# Patient Record
Sex: FEMALE | Race: BLACK OR AFRICAN AMERICAN | NOT HISPANIC OR LATINO | Employment: FULL TIME | URBAN - METROPOLITAN AREA
[De-identification: names, ages, dates, MRNs, and addresses within clinical notes are randomized per-mention and may not be internally consistent; named-entity substitution may affect disease eponyms.]

---

## 2017-07-17 RX ORDER — IRBESARTAN 150 MG/1
TABLET ORAL
Qty: 90 TABLET | Refills: 1 | Status: SHIPPED | OUTPATIENT
Start: 2017-07-17 | End: 2017-11-17 | Stop reason: SDUPTHER

## 2017-08-07 RX ORDER — ATENOLOL AND CHLORTHALIDONE TABLET 100; 25 MG/1; MG/1
TABLET ORAL
Qty: 45 TABLET | Refills: 1 | Status: SHIPPED | OUTPATIENT
Start: 2017-08-07 | End: 2017-11-17 | Stop reason: SDUPTHER

## 2017-11-17 ENCOUNTER — OFFICE VISIT (OUTPATIENT)
Dept: FAMILY MEDICINE | Facility: CLINIC | Age: 42
End: 2017-11-17
Payer: COMMERCIAL

## 2017-11-17 VITALS
SYSTOLIC BLOOD PRESSURE: 138 MMHG | HEART RATE: 71 BPM | WEIGHT: 208 LBS | HEIGHT: 65 IN | DIASTOLIC BLOOD PRESSURE: 88 MMHG | BODY MASS INDEX: 34.66 KG/M2

## 2017-11-17 DIAGNOSIS — I10 ESSENTIAL HYPERTENSION: Primary | ICD-10-CM

## 2017-11-17 PROCEDURE — 99213 OFFICE O/P EST LOW 20 MIN: CPT | Mod: ,,, | Performed by: INTERNAL MEDICINE

## 2017-11-17 RX ORDER — ATENOLOL AND CHLORTHALIDONE TABLET 100; 25 MG/1; MG/1
0.5 TABLET ORAL DAILY
Qty: 45 TABLET | Refills: 1 | Status: SHIPPED | OUTPATIENT
Start: 2017-11-17 | End: 2018-08-26 | Stop reason: SDUPTHER

## 2017-11-17 RX ORDER — THYROID, PORCINE 30 MG/1
1 TABLET ORAL DAILY
COMMUNITY
Start: 2017-11-08 | End: 2018-04-26

## 2017-11-17 RX ORDER — ERGOCALCIFEROL 1.25 MG/1
50000 CAPSULE ORAL
COMMUNITY
End: 2019-06-24

## 2017-11-17 RX ORDER — IRBESARTAN 150 MG/1
150 TABLET ORAL NIGHTLY
Qty: 90 TABLET | Refills: 1 | Status: SHIPPED | OUTPATIENT
Start: 2017-11-17 | End: 2018-04-26 | Stop reason: ALTCHOICE

## 2017-11-17 NOTE — PROGRESS NOTES
Subjective:       Patient ID: George Denny is a 42 y.o. female.    Chief Complaint: Hypertension    Ms. Vazquez is a 42-year-old -American female who comes for follow-up. She has underlying hypertension.    She has seen her gynecologist recently and has been prescribed [place] Thyroid for supposed hormonal abnormality.      Hypertension   This is a chronic problem. The current episode started more than 1 year ago. The problem has been waxing and waning since onset. The problem is uncontrolled. Associated symptoms include sweats. Pertinent negatives include no anxiety, chest pain, headaches, palpitations, peripheral edema or shortness of breath. Risk factors for coronary artery disease include sedentary lifestyle, obesity and dyslipidemia. Past treatments include beta blockers, diuretics and angiotensin blockers. The current treatment provides moderate improvement. Compliance problems include exercise and diet (Ladarius Pope cofeadelaida Carlynda price, 2-4 sccops of icecream daily).  There is no history of heart failure, PVD, renovascular disease or retinopathy. There is no history of pheochromocytoma.       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[employer]     [employer] [place]     Social History Main Topics    Smoking status: Never Smoker    Smokeless tobacco: Never Used    Alcohol use Yes    Drug use: No    Sexual activity: Yes     Partners: Male     Other Topics Concern    Not on file     Social History Narrative    No narrative on file     Past Surgical History:   Procedure Laterality Date     SECTION      HERNIA REPAIR      HYSTERECTOMY       Family History   Problem Relation Age of Onset     "Hypertension Mother     Heart disease Mother     Diabetes Father     Heart disease Father     Hypertension Father     Gout Father        Review of Systems   Constitutional: Negative for activity change, chills, fatigue, fever and unexpected weight change.   HENT: Negative for congestion, postnasal drip and sinus pressure.    Eyes: Negative for pain, discharge and visual disturbance.   Respiratory: Negative for cough, chest tightness and shortness of breath.    Cardiovascular: Negative for chest pain, palpitations and leg swelling.        Hypertension   Gastrointestinal: Negative for abdominal distention, anal bleeding, constipation and diarrhea.   Endocrine: Negative for heat intolerance and polyphagia.        Patient has been told that she needs [place] Thyroid.   Genitourinary: Negative for difficulty urinating, dysuria, flank pain and frequency.   Musculoskeletal: Negative for arthralgias and joint swelling.   Skin: Negative for color change, pallor and rash.   Allergic/Immunologic: Negative for environmental allergies, food allergies and immunocompromised state.   Neurological: Negative for dizziness, tremors, seizures, syncope, light-headedness and headaches.   Hematological: Negative for adenopathy. Does not bruise/bleed easily.   Psychiatric/Behavioral: Negative for agitation, confusion and dysphoric mood. The patient is not nervous/anxious.        Objective:       Vitals:    11/17/17 0954 11/17/17 1055   BP: (!) 141/81 138/88   BP Location:  Right arm   Patient Position:  Sitting   BP Method:  Large (Automatic)   Pulse: 71    Weight: 94.3 kg (208 lb)    Height: 5' 5" (1.651 m)      Physical Exam   Constitutional: She is oriented to person, place, and time. She appears well-developed. She is cooperative. No distress.   Simple obesity with a BMI of 34.   HENT:   Head: Normocephalic and atraumatic.   Eyes: Conjunctivae, EOM and lids are normal. Pupils are equal, round, and reactive to light. Lids are " everted and swept, no foreign bodies found. Right pupil is round and reactive. Left pupil is round and reactive.   Neck: Trachea normal and normal range of motion. Neck supple.   Cardiovascular: Normal rate, regular rhythm, S1 normal, S2 normal and normal heart sounds.    Pulmonary/Chest: Breath sounds normal.   Abdominal: Soft. Bowel sounds are normal. There is no rigidity and no guarding.   Musculoskeletal: Normal range of motion.   Lymphadenopathy:     She has no cervical adenopathy.   Neurological: She is alert and oriented to person, place, and time.   Skin: Skin is warm and dry.   Psychiatric: She has a normal mood and affect. Her behavior is normal. Judgment and thought content normal.   Nursing note and vitals reviewed.      Assessment:       1. Essential hypertension         Plan:           Essential hypertension    Other orders  -     atenolol-chlorthalidone (TENORETIC) 100-25 mg per tablet; Take 0.5 tablets by mouth once daily.  Dispense: 45 tablet; Refill: 1  -     irbesartan (AVAPRO) 150 MG tablet; Take 1 tablet (150 mg total) by mouth every evening.  Dispense: 90 tablet; Refill: 1    Patient has been advised to watch diet and exercise. Avoid fried and fatty food. Compliance to medications and follow up urged.      I had a moderate dietary discussion with her and I pointed out to her that the [employer] loaded coffee and 3-4 scoops of ice cream might be a big calorie load for her. I've asked her to calculate the calories and she needs to start cutting down on above mentioned foods.    I will see her in 3-4 months and have set a target for her to be less than 200 pounds. More greens and vegetables. Modest exercise. Preventive care issues and seasonal immunizations have also been discussed.    Patient examined in presence of Ms. Wheat.

## 2017-11-17 NOTE — PATIENT INSTRUCTIONS
Taking Your Blood Pressure  Blood pressure is the force of blood against the artery wall as it moves from the heart through the blood vessels. You can take your own blood pressure reading using a digital monitor. Take your readings the same each time, using the same arm. Take readings as often as your healthcare provider instructs.  About blood pressure monitors  Blood pressure monitors are designed for certain ages and cases. You can find monitors for older adults, for pregnant women, and for children. Make sure the one you choose is the right one for your age and situation.  The American Heart Association recommends an automatic cuff monitor that fits on your upper arm (bicep). The cuff should fit your arm size. A cuff thats too large or too small will not give an accurate reading. Measure around your upper arm to find your size.  Monitors that attach to your finger or wrist are not as accurate as monitors for your upper arm.  Ask your healthcare provider for help in choosing a monitor. Bring your monitor to your next provider visit if you need help in using it the correct way.  The steps below are general instructions for using an automatic digital monitor.  Step 1. Relax    · Take your blood pressure at the same time every day, such as in the morning or evening, or at the time your healthcare provider recommends.  · Wait at least a half-hour after smoking, eating, or exercising. Don't drink coffee, tea, soda, or other caffeinated beverages before checking your blood pressure.  · Sit comfortably at a table with both feet on the floor. Do not cross your legs or feet. Place the monitor near you.  · Rest for a few minutes before you begin.  Step 2. Wrap the cuff    · Place your arm on the table, palm up. Your arm should be at the level of your heart. Wrap the cuff around your upper arm, just above your elbow. Its best done on bare skin, not over clothing. Most cuffs will indicate where the brachial artery (the  blood vessel in the middle of the arm at the inner side of the elbow) should line up with the cuff. Look in your monitor's instruction booklet for an illustration. You can also bring your cuff to your healthcare provider and have them show you how to correctly place the cuff.  Step 3. Inflate the cuff    · Push the button that starts the pump.  · The cuff will tighten, then loosen.  · The numbers will change. When they stop changing, your blood pressure reading will appear.  · Take 2 or 3 readings one minute apart.  Step 4. Write down the results of each reading    · Write down your blood pressure numbers for each reading. Note the date and time. Keep your results in one place, such as a notebook. Even if your monitor has a built-in memory, keep a hard copy of the readings.  · Remove the cuff from your arm. Turn off the machine.  · Bring your blood pressure records with your healthcare providers at each visit.  · If you start a new blood pressure medicine, note the day you started the new medicine. Also note the day if you change the dose of your medicine. This information goes on your blood pressure recording sheet. This will help your healthcare provider monitor how well the medicine changes are working.  · Ask your healthcare provider what numbers should prompt you to call him or her. Also ask what numbers should prompt you to get help right away.  Date Last Reviewed: 11/1/2016  © 6635-1840 [employer]. [address] [place] [address], [place] [address]. All rights reserved. This information is not intended as a substitute for professional medical care. Always follow your healthcare professional's instructions.

## 2017-11-18 LAB
ALBUMIN SERPL-MCNC: 4.6 G/DL (ref 3.6–5.1)
ALBUMIN/CREAT UR: 2 MCG/MG CREAT
ALBUMIN/GLOB SERPL: 1.4 (CALC) (ref 1–2.5)
ALP SERPL-CCNC: 48 U/L (ref 33–115)
ALT SERPL-CCNC: 9 U/L (ref 6–29)
AST SERPL-CCNC: 15 U/L (ref 10–30)
BILIRUB SERPL-MCNC: 0.5 MG/DL (ref 0.2–1.2)
BUN SERPL-MCNC: 12 MG/DL (ref 7–25)
BUN/CREAT SERPL: ABNORMAL (CALC) (ref 6–22)
CALCIUM SERPL-MCNC: 9.9 MG/DL (ref 8.6–10.2)
CHLORIDE SERPL-SCNC: 101 MMOL/L (ref 98–110)
CHOLEST SERPL-MCNC: 202 MG/DL
CHOLEST/HDLC SERPL: 3.5 (CALC)
CO2 SERPL-SCNC: 33 MMOL/L (ref 20–31)
CREAT SERPL-MCNC: 0.74 MG/DL (ref 0.5–1.1)
CREAT UR-MCNC: 88 MG/DL (ref 20–320)
GFR SERPL CREATININE-BSD FRML MDRD: 99 ML/MIN/1.73M2
GLOBULIN SER CALC-MCNC: 3.2 G/DL (CALC) (ref 1.9–3.7)
GLUCOSE SERPL-MCNC: 98 MG/DL (ref 65–99)
HDLC SERPL-MCNC: 58 MG/DL
LDLC SERPL CALC-MCNC: 116 MG/DL (CALC)
MICROALBUMIN UR-MCNC: 0.2 MG/DL
NONHDLC SERPL-MCNC: 144 MG/DL (CALC)
POTASSIUM SERPL-SCNC: 4.8 MMOL/L (ref 3.5–5.3)
PROT SERPL-MCNC: 7.8 G/DL (ref 6.1–8.1)
SODIUM SERPL-SCNC: 139 MMOL/L (ref 135–146)
TRIGL SERPL-MCNC: 162 MG/DL

## 2018-01-31 ENCOUNTER — TELEPHONE (OUTPATIENT)
Dept: FAMILY MEDICINE | Facility: CLINIC | Age: 43
End: 2018-01-31

## 2018-01-31 ENCOUNTER — OFFICE VISIT (OUTPATIENT)
Dept: FAMILY MEDICINE | Facility: CLINIC | Age: 43
End: 2018-01-31
Payer: COMMERCIAL

## 2018-01-31 VITALS
OXYGEN SATURATION: 99 % | BODY MASS INDEX: 34.66 KG/M2 | HEART RATE: 77 BPM | SYSTOLIC BLOOD PRESSURE: 122 MMHG | HEIGHT: 65 IN | DIASTOLIC BLOOD PRESSURE: 84 MMHG | WEIGHT: 208 LBS

## 2018-01-31 DIAGNOSIS — M54.14 RADICULAR PAIN OF THORACIC REGION: Primary | ICD-10-CM

## 2018-01-31 DIAGNOSIS — M54.6 ACUTE MIDLINE THORACIC BACK PAIN: ICD-10-CM

## 2018-01-31 PROCEDURE — 3008F BODY MASS INDEX DOCD: CPT | Mod: ,,, | Performed by: NURSE PRACTITIONER

## 2018-01-31 PROCEDURE — 99213 OFFICE O/P EST LOW 20 MIN: CPT | Mod: ,,, | Performed by: NURSE PRACTITIONER

## 2018-01-31 RX ORDER — TRAMADOL HYDROCHLORIDE 50 MG/1
50 TABLET ORAL EVERY 6 HOURS PRN
Refills: 0 | COMMUNITY
Start: 2018-01-25 | End: 2018-04-26

## 2018-01-31 RX ORDER — CYCLOBENZAPRINE HCL 10 MG
1 TABLET ORAL EVERY 6 HOURS PRN
Refills: 0 | COMMUNITY
Start: 2018-01-25 | End: 2018-04-26

## 2018-01-31 NOTE — TELEPHONE ENCOUNTER
Pt. called & said that you gave her a note saying she couldn't lift more than 10 lbs. but it didn't say how long. She wanted to know if you could do another note w/that info. on it.

## 2018-01-31 NOTE — PROGRESS NOTES
Subjective:       Patient ID: George Denny is a 42 y.o. female.    Chief Complaint: Back Pain and Follow-up (Urgent Care visit)    Patient presents for evaluation of thoracic back pain.  Patient was evaluated at urgent care last week for this acute back pain.  Pain has not improved with medication.  Patient pain initially occurred when she was bending over.  Patient felt a pull in the upper back and has had aching and radiating pain since that time.  Patient states her pain is midline on the spine and radiating around to the sides.  Patient states the pain is worse when she lifts her arms over her head.  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  This requires lifting, pushing, and pulling that she is unable to perform at this time due to injury.      Back Pain   This is a new problem. The current episode started 1 to 4 weeks ago (1/22/2018, 9 days ago). The problem has been waxing and waning since onset. The pain is present in the thoracic spine. The quality of the pain is described as stabbing. Radiates to: around to sides. The pain is at a severity of 6/10. The pain is moderate. The pain is worse during the day. The symptoms are aggravated by bending, sitting and position. Pertinent negatives include no abdominal pain, bladder incontinence, bowel incontinence, chest pain, dysuria, fever, headaches, leg pain, numbness, paresis, paresthesias, pelvic pain, perianal numbness, tingling, weakness or weight loss. Risk factors include poor posture and obesity. She has tried heat, bed rest, muscle relaxant and NSAIDs (tramadol) for the symptoms. The treatment provided mild relief.     Review of Systems   Constitutional: Negative for activity change, appetite change, fever and weight loss.   HENT: Negative for congestion, sore throat, trouble swallowing and voice change.    Eyes: Negative for photophobia, pain, discharge and visual disturbance.   Respiratory: Negative for cough, chest tightness, shortness of  breath and wheezing.    Cardiovascular: Negative for chest pain, palpitations and leg swelling.   Gastrointestinal: Negative for abdominal pain, bowel incontinence, nausea and vomiting.   Endocrine: Negative for cold intolerance and heat intolerance.   Genitourinary: Negative for bladder incontinence, difficulty urinating, dysuria and pelvic pain.   Musculoskeletal: Positive for back pain. Negative for arthralgias, gait problem, joint swelling and neck stiffness.   Skin: Negative for rash.   Allergic/Immunologic: Negative for immunocompromised state.   Neurological: Negative for tingling, speech difficulty, weakness, numbness, headaches and paresthesias.   Psychiatric/Behavioral: Negative for confusion, self-injury and suicidal ideas.       Past Medical History:   Diagnosis Date    GERD (gastroesophageal reflux disease)     Hypertension     Thyroid disease     as per Dr Padilla      Past Surgical History:   Procedure Laterality Date     SECTION      HERNIA REPAIR      HYSTERECTOMY         Family History   Problem Relation Age of Onset    Hypertension Mother     Heart disease Mother     Diabetes Father     Heart disease Father     Hypertension Father     Gout Father        Social History     Social History    Marital status:      Spouse name: N/A    Number of children: N/A    Years of education: N/A     Occupational History    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      atenolol-chlorthalidone (TENORETIC) 100-25 mg per tablet Take 0.5 tablets by mouth once daily. 45 tablet 1    cyclobenzaprine (FLEXERIL) 10  "MG tablet Take 1 tablet by mouth every 6 (six) hours as needed.  0    ergocalciferol (VITAMIN D2) 50,000 unit Cap Take 50,000 Units by mouth every 7 days.      irbesartan (AVAPRO) 150 MG tablet Take 1 tablet (150 mg total) by mouth every evening. 90 tablet 1    traMADol (ULTRAM) 50 mg tablet Take 50 mg by mouth every 6 (six) hours as needed.  0     No current facility-administered medications for this visit.        Review of patient's allergies indicates:  No Known Allergies  Objective:    HPI     Follow-up    Additional comments: Urgent Care visit       Last edited by Kathleen Ferguson LPN on 1/31/2018  9:30 AM. (History)      Blood pressure 122/84, pulse 77, height 5' 5" (1.651 m), weight 94.3 kg (208 lb), SpO2 99 %. Body mass index is 34.61 kg/m².   Physical Exam   Constitutional: She is oriented to person, place, and time. She appears well-developed. She is cooperative. No distress.   obese   HENT:   Head: Normocephalic and atraumatic.   Right Ear: Tympanic membrane normal.   Left Ear: Tympanic membrane normal.   Eyes: Conjunctivae, EOM and lids are normal. Pupils are equal, round, and reactive to light. Lids are everted and swept, no foreign bodies found. Right pupil is round and reactive. Left pupil is round and reactive.   Neck: Trachea normal and normal range of motion. Neck supple.   Cardiovascular: Normal rate, regular rhythm, S1 normal, S2 normal, normal heart sounds and intact distal pulses.    Pulmonary/Chest: Effort normal and breath sounds normal. No respiratory distress.   Abdominal: Soft. Bowel sounds are normal. There is no tenderness. There is no rigidity and no guarding.   Musculoskeletal: Normal range of motion.        Thoracic back: She exhibits pain. She exhibits no tenderness, no bony tenderness, no swelling, no edema, no deformity and no spasm.        Back:    Lymphadenopathy:     She has no cervical adenopathy.     She has no axillary adenopathy.   Neurological: She is alert and oriented to " person, place, and time.   Skin: Skin is warm and dry. Capillary refill takes less than 2 seconds.   Psychiatric: She has a normal mood and affect. Her behavior is normal. Judgment and thought content normal.   Nursing note and vitals reviewed.          Assessment:       1. Radicular pain of thoracic region    2. Acute midline thoracic back pain    3. BMI 34.0-34.9,adult        Plan:       George was seen today for back pain and follow-up.    Diagnoses and all orders for this visit:    Radicular pain of thoracic region  -     X-Ray Thoracic Spine 4 or more views; Future  -     X-Ray Thoracic Spine 4 or more views  -     Ambulatory Referral to Physical/Occupational Therapy    Acute midline thoracic back pain  -     X-Ray Thoracic Spine 4 or more views; Future  -     X-Ray Thoracic Spine 4 or more views  -     Ambulatory Referral to Physical/Occupational Therapy    BMI 34.0-34.9,adult       Obtain x-ray.  Will call with results.

## 2018-01-31 NOTE — PATIENT INSTRUCTIONS
Back Care Tips    Caring for your back  These are things you can do to prevent a recurrence of acute back pain and to reduce symptoms from chronic back pain:  · Maintain a healthy weight. If you are overweight, losing weight will help most types of back pain.  · Exercise is an important part of recovery from most types of back pain. The muscles behind and in front of the spine support the back. This means strengthening both the back muscles and the abdominal muscles will provide better support for your spine.   · Swimming and brisk walking are good overall exercises to improve your fitness level.  · Practice safe lifting methods (below).  · Practice good posture when sitting, standing and walking. Avoid prolonged sitting. This puts more stress on the lower back than standing or walking.  · Wear quality shoes with sufficient arch support. Foot and ankle alignment can affect back symptoms. Women should avoid wearing high heels.  · Therapeutic massage can help relax the back muscles without stretching them.  · During the first 24 to 72 hours after an acute injury or flare-up of chronic back pain, apply an ice pack to the painful area for 20 minutes and then remove it for 20 minutes, over a period of 60 to 90 minutes, or several times a day. As a safety precaution, do not use a heating pad at bedtime. Sleeping on a heating pad can lead to skin burns or tissue damage.  · You can alternate ice and heat therapies.  Medications  Talk to your healthcare provider before using medicines, especially if you have other medical problems or are taking other medicines.  · You may use acetaminophen or ibuprofen to control pain, unless your healthcare provider prescribed other pain medicine. If you have chronic conditions like diabetes, liver or kidney disease, stomach ulcers, or gastrointestinal bleeding, or are taking blood thinners, talk with your healthcare provider before taking any medicines.  · Be careful if you are given  prescription pain medicines, narcotics, or medicine for muscle spasm. They can cause drowsiness, affect your coordination, reflexes, and judgment. Do not drive or operate heavy machinery while taking these types of medicines. Take prescription pain medicine only as prescribed by your healthcare provider.  Lumbar stretch  Here is a simple stretching exercise that will help relax muscle spasm and keep your back more limber. If exercise makes your back pain worse, dont do it.  · Lie on your back with your knees bent and both feet on the ground.  · Slowly raise your left knee to your chest as you flatten your lower back against the floor. Hold for 5 seconds.  · Relax and repeat the exercise with your right knee.  · Do 10 of these exercises for each leg.  Safe lifting method  · Dont bend over at the waist to lift an object off the floor.  Instead, bend your knees and hips in a squat.   · Keep your back and head upright  · Hold the object close to your body, directly in front of you.  · Straighten your legs to lift the object.   · Lower the object to the floor in the reverse fashion.  · If you must slide something across the floor, push it.  Posture tips  Sitting  Sit in chairs with straight backs or low-back support. Keep your knees lower than your hips, with your feet flat on the floor.  When driving, sit up straight. Adjust the seat forward so you are not leaning toward the steering wheel.  A small pillow or rolled towel behind your lower back may help if you are driving long distances.   Standing  When standing for long periods, shift most of your weight to one leg at a time. Alternate legs every few minutes.   Sleeping  The best way to sleep is on your side with your knees bent. Put a low pillow under your head to support your neck in a neutral spine position. Avoid thick pillows that bend your neck to one side. Put a pillow between your legs to further relax your lower back. If you sleep on your back, put pillows  under your knees to support your legs in a slightly flexed position. Use a firm mattress. If your mattress sags, replace it, or use a 1/2-inch plywood board under the mattress to add support.  Follow-up care  Follow up with your healthcare provider, or as advised.  If X-rays, a CT scan or an MRI scan were taken, they will be reviewed by a radiologist. You will be notified of any new findings that may affect your care.  Call 911  Seek emergency medical care if any of the following occur:  · Trouble breathing  · Confusion  · Very drowsy  · Fainting or loss of consciousness  · Rapid or very slow heart rate  · Loss of  bowel or bladder control  When to seek medical care  Call your healthcare provider if any of the following occur:  · Pain becomes worse or spreads to your arms or legs  · Weakness or numbness in one or both arms or legs  · Numbness in the groin area  Date Last Reviewed: 6/1/2016  © 0054-3322 [employer]. [address] [place] [address], [place] [address]. All rights reserved. This information is not intended as a substitute for professional medical care. Always follow your healthcare professional's instructions.

## 2018-01-31 NOTE — LETTER
January 31, 2018    George Denny  [address]  [place] [address]         [address]  [place] [address]  Phone: [phone]  Fax: [phone] January 31, 2018     Patient: George Denny   YOB: 1975   Date of Visit: 1/31/2018       To Whom It May Concern:    It is my medical opinion that George Denny may return to light duty immediately with the following restrictions: No lifting heavier than 10 lbs..    If you have any questions or concerns, please don't hesitate to call.    Sincerely,        HARRIS Mendes

## 2018-01-31 NOTE — TELEPHONE ENCOUNTER
----- Message from HARRIS Mendes sent at 1/31/2018 12:11 PM CST -----  Chest x-ray is normal.  Patient should continue with physical therapy.

## 2018-04-26 ENCOUNTER — OFFICE VISIT (OUTPATIENT)
Dept: FAMILY MEDICINE | Facility: CLINIC | Age: 43
End: 2018-04-26
Payer: COMMERCIAL

## 2018-04-26 VITALS
HEIGHT: 65 IN | DIASTOLIC BLOOD PRESSURE: 78 MMHG | WEIGHT: 196 LBS | SYSTOLIC BLOOD PRESSURE: 108 MMHG | HEART RATE: 63 BPM | BODY MASS INDEX: 32.65 KG/M2

## 2018-04-26 DIAGNOSIS — I10 ESSENTIAL HYPERTENSION: Primary | ICD-10-CM

## 2018-04-26 DIAGNOSIS — E03.9 ACQUIRED HYPOTHYROIDISM: ICD-10-CM

## 2018-04-26 PROCEDURE — 99213 OFFICE O/P EST LOW 20 MIN: CPT | Mod: ,,, | Performed by: INTERNAL MEDICINE

## 2018-04-26 PROCEDURE — 3074F SYST BP LT 130 MM HG: CPT | Mod: ,,, | Performed by: INTERNAL MEDICINE

## 2018-04-26 PROCEDURE — 3078F DIAST BP <80 MM HG: CPT | Mod: ,,, | Performed by: INTERNAL MEDICINE

## 2018-04-26 RX ORDER — THYROID 30 MG/1
30 TABLET ORAL 2 TIMES DAILY
COMMUNITY
End: 2020-06-25 | Stop reason: CLARIF

## 2018-04-26 NOTE — PATIENT INSTRUCTIONS
Taking Your Blood Pressure  Blood pressure is the force of blood against the artery wall as it moves from the heart through the blood vessels. You can take your own blood pressure reading using a digital monitor. Take your readings the same each time, using the same arm. Take readings as often as your healthcare provider instructs.  About blood pressure monitors  Blood pressure monitors are designed for certain ages and cases. You can find monitors for older adults, for pregnant women, and for children. Make sure the one you choose is the right one for your age and situation.  The American Heart Association recommends an automatic cuff monitor that fits on your upper arm (bicep). The cuff should fit your arm size. A cuff thats too large or too small will not give an accurate reading. Measure around your upper arm to find your size.  Monitors that attach to your finger or wrist are not as accurate as monitors for your upper arm.  Ask your healthcare provider for help in choosing a monitor. Bring your monitor to your next provider visit if you need help in using it the correct way.  The steps below are general instructions for using an automatic digital monitor.  Step 1. Relax    · Take your blood pressure at the same time every day, such as in the morning or evening, or at the time your healthcare provider recommends.  · Wait at least a half-hour after smoking, eating, or exercising. Don't drink coffee, tea, soda, or other caffeinated beverages before checking your blood pressure.  · Sit comfortably at a table with both feet on the floor. Do not cross your legs or feet. Place the monitor near you.  · Rest for a few minutes before you begin.  Step 2. Wrap the cuff    · Place your arm on the table, palm up. Your arm should be at the level of your heart. Wrap the cuff around your upper arm, just above your elbow. Its best done on bare skin, not over clothing. Most cuffs will indicate where the brachial artery (the  blood vessel in the middle of the arm at the inner side of the elbow) should line up with the cuff. Look in your monitor's instruction booklet for an illustration. You can also bring your cuff to your healthcare provider and have them show you how to correctly place the cuff.  Step 3. Inflate the cuff    · Push the button that starts the pump.  · The cuff will tighten, then loosen.  · The numbers will change. When they stop changing, your blood pressure reading will appear.  · Take 2 or 3 readings one minute apart.  Step 4. Write down the results of each reading    · Write down your blood pressure numbers for each reading. Note the date and time. Keep your results in one place, such as a notebook. Even if your monitor has a built-in memory, keep a hard copy of the readings.  · Remove the cuff from your arm. Turn off the machine.  · Bring your blood pressure records with your healthcare providers at each visit.  · If you start a new blood pressure medicine, note the day you started the new medicine. Also note the day if you change the dose of your medicine. This information goes on your blood pressure recording sheet. This will help your healthcare provider monitor how well the medicine changes are working.  · Ask your healthcare provider what numbers should prompt you to call him or her. Also ask what numbers should prompt you to get help right away.  Date Last Reviewed: 11/1/2016 © 2000-2017 [employer]. [address] [place] [id number]. All rights reserved. This information is not intended as a substitute for professional medical care. Always follow your healthcare professional's instructions.        Hypothyroidism       You have hypothyroidism. This means your thyroid gland is not making enough thyroid hormone. This hormone is vital to body growth and metabolism. If you dont make enough, many body processes slow down. This can cause symptoms throughout the body. Hypothyroidism can range  from mild to severe. The most severe form is called myxedema.  There are a number of causes of hypothyroidism. A common cause is Hashimotos disease. This disease causes the bodys own immune system to attack the thyroid gland. When you have certain treatments, such as surgery to remove the thyroid gland, this can also cause hypothyroidism.  Symptoms of hypothyroidism can include:  · Fatigue  · Trouble concentrating or thinking clearly; forgetfulness  · Dry skin  · Hair loss  · Weight gain  · Low tolerance to cold  · Constipation  · Depression  · Personality changes  · Tingling or prickling of the hands or feet  · Heavy, absent, or irregular periods (women only)  Older adults may sometimes have other symptoms. These can include:  · Muscle aches and weakness  · Confusion  · Incontinence (unable to control urine or stool)  · Trouble moving around  · Falling  Treatment for hypothyroidism involves taking thyroid hormone pills daily. These pills replace the hormone your thyroid doesnt make. You will likely need to take a daily pill for the rest of your life. Tips for taking this medicine are given below.  Home care  Tips for taking your medicine  · Take your thyroid hormone pills as prescribed by your healthcare provider. This is most often 1 pill a day on an empty stomach. Use a pillbox labeled with the days of the week. This will help you remember to take your pill each day.  · Dont take products that contain iron and calcium or antacids within 4 hours of taking your thyroid hormone pills.  · Dont take other medicines with your thyroid hormone pill without checking with your provider first.  · Tell your provider if you have any side effects from your medicines that bother you.  · Never change the dosage or stop taking your thyroid pills without talking to your provider first.  General care  · Always talk with your provider before trying other medicines or treatments for your thyroid problem.  · If you see other  healthcare providers, be sure to let them know about your thyroid problem.  Follow-up care  See your healthcare provider for checkups as advised. You may need regular tests to check the level of thyroid hormone in your blood.  When to seek medical advice  Call your healthcare provider right away if any of these occur:  · New symptoms develop  · Symptoms return, continue, or worsen even after treatment  · Extreme fatigue  · Puffy hands, face, or feet  · Fast or irregular heartbeat  · Confusion  Call 911  Call 911 right away if any of these occur:  · Fainting  · Chest pain  · Shortness of breath or trouble breathing  Date Last Reviewed: 8/24/2015  © 8341-0232 [employer]. [address] [place] [address], [place] [address]. All rights reserved. This information is not intended as a substitute for professional medical care. Always follow your healthcare professional's instructions.

## 2018-04-26 NOTE — PROGRESS NOTES
Subjective:       Patient ID: George Denny is a 42 y.o. female.    Chief Complaint: Hypertension ( ) and Thyroid Problem    Ms. George Denny is a pleasant 42-year-old female who comes for follow-up. She has underlying hypertension. She is on a combination of atenolol chlorthalidone and Avapro. She has managed to lose moderately good amount of weight over the last few months. This she has managed by watching her diet as she has become piscetarian.    Patient's blood pressures are doing fairly good.      She has been prescribed [place] Thyroid and subsequently NP thyroid for a diagnosis of underactive thyroid but her gynecologist Dr. Padilla. I do not have any labs today few or confirm that.            Hypertension   This is a chronic problem. The current episode started more than 1 year ago. The problem has been rapidly improving since onset. Pertinent negatives include no chest pain, headaches, malaise/fatigue, palpitations, peripheral edema or shortness of breath. Past treatments include angiotensin blockers, beta blockers and diuretics. There is no history of PVD or renovascular disease. Identifiable causes of hypertension include a thyroid problem. There is no history of pheochromocytoma.   Thyroid Problem   Presents for follow-up visit. Symptoms include weight loss (he weight loss is anticipated and intentional.). Patient reports no anxiety, constipation, diarrhea, fatigue, heat intolerance or palpitations. The symptoms have been stable.       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[employer]     [employer] [place]     Social History Main Topics    Smoking status: Never Smoker    Smokeless tobacco: Never Used     "Alcohol use Yes    Drug use: No    Sexual activity: Yes     Partners: Male     Other Topics Concern    Not on file     Social History Narrative    No narrative on file     Past Surgical History:   Procedure Laterality Date     SECTION      HERNIA REPAIR      HYSTERECTOMY       Family History   Problem Relation Age of Onset    Hypertension Mother     Heart disease Mother     Diabetes Father     Heart disease Father     Hypertension Father     Gout Father        Review of Systems   Constitutional: Positive for weight loss (he weight loss is anticipated and intentional.). Negative for activity change, chills, fatigue, fever, malaise/fatigue and unexpected weight change.   HENT: Negative for congestion, postnasal drip and sinus pressure.    Eyes: Negative for pain, discharge and visual disturbance.   Respiratory: Negative for cough, chest tightness and shortness of breath.    Cardiovascular: Negative for chest pain, palpitations and leg swelling.        Hypertension   Gastrointestinal: Negative for abdominal distention, anal bleeding, constipation and diarrhea.   Endocrine: Negative for heat intolerance and polyphagia.        [facility] thyroid is probably  not on insurance or it is expensive. She has been changed to NP thyroid.   Genitourinary: Negative for difficulty urinating and dysuria.   Musculoskeletal: Positive for back pain. Negative for arthralgias and joint swelling.   Skin: Negative for color change, pallor and rash.   Allergic/Immunologic: Negative for environmental allergies, food allergies and immunocompromised state.   Neurological: Negative for dizziness, syncope, light-headedness and headaches.   Hematological: Negative for adenopathy. Does not bruise/bleed easily.   Psychiatric/Behavioral: Negative for agitation and confusion. The patient is not nervous/anxious.        Objective:       Vitals:    18 1035   BP: 108/78   Pulse: 63   Weight: 88.9 kg (196 lb)   Height: 5' 5" (1.651 " m)     Physical Exam   Constitutional: She appears well-developed. She is cooperative. No distress.   Simple obesity with a BMI of 32.   HENT:   Head: Normocephalic and atraumatic.   Eyes: Conjunctivae, EOM and lids are normal. Pupils are equal, round, and reactive to light. Lids are everted and swept, no foreign bodies found. Right pupil is round and reactive. Left pupil is round and reactive.   Neck: Trachea normal and normal range of motion. Neck supple.   Cardiovascular: Normal rate, regular rhythm, S1 normal, S2 normal and normal heart sounds.    Pulmonary/Chest: Breath sounds normal.   Abdominal: Soft. Bowel sounds are normal. There is no rigidity and no guarding.   Musculoskeletal: Normal range of motion.   Lymphadenopathy:     She has no cervical adenopathy.   Neurological: She is alert.   Skin: Skin is warm and dry.   Psychiatric: She has a normal mood and affect.   Nursing note and vitals reviewed.      Assessment:       1. Essential hypertension    2. Acquired hypothyroidism         Plan:           Essential hypertension  -     Comprehensive metabolic panel; Future; Expected date: 04/26/2018  -     Lipid panel; Future; Expected date: 04/26/2018    Acquired hypothyroidism    I've complimented pt on losing weight and her blood pressures are good. I will stop the irbesartan at this point. She'll continue with atenolol chlorthalidone at half a pill per day.    I'm hoping she loses weight by the 6-10 pounds in the next 6 months.    At that point we hope that her blood pressures are doing good so that we can eliminate all the blood pressure medications. She'll continue with her thyroid supplement.    However, I just want to be sure that she is truly hypothyroid and I would like to review her labs from her gynecologist office. If it is not convincing, I will ask her to stop the thyroxine supplementation so that we can independently verify the nature of her thyroid after she has stopped the medications for at  least a month.    I will see her back in approximately 4-6 months time to review her blood pressures and see how she is progressing.    Her back pain which she sustained at work will be reviewed   independently from today's visit.    Patient seen with Ms. Wheat as chaperone.

## 2018-04-26 NOTE — PROGRESS NOTES
Ms. Vazquez also was involved in a workmen's compensation injury in the mid back. She went through physical therapy. She is feeling approximately 80-90% better and is back to work.    She is back to full duty at this point. I've advised caution and proper exercises for the back. In case she has any recurrence of problems than she can call us back.

## 2018-05-06 ENCOUNTER — PATIENT MESSAGE (OUTPATIENT)
Dept: FAMILY MEDICINE | Facility: CLINIC | Age: 43
End: 2018-05-06

## 2018-08-10 RX ORDER — IRBESARTAN 150 MG/1
TABLET ORAL
Qty: 90 TABLET | Refills: 1 | Status: SHIPPED | OUTPATIENT
Start: 2018-08-10 | End: 2019-06-24

## 2018-08-26 RX ORDER — ATENOLOL AND CHLORTHALIDONE TABLET 100; 25 MG/1; MG/1
TABLET ORAL
Qty: 45 TABLET | Refills: 1 | Status: SHIPPED | OUTPATIENT
Start: 2018-08-26 | End: 2019-03-21 | Stop reason: SDUPTHER

## 2019-03-06 ENCOUNTER — TELEPHONE (OUTPATIENT)
Dept: FAMILY MEDICINE | Facility: CLINIC | Age: 44
End: 2019-03-06

## 2019-03-21 RX ORDER — ATENOLOL AND CHLORTHALIDONE TABLET 100; 25 MG/1; MG/1
0.5 TABLET ORAL DAILY
Qty: 45 TABLET | Refills: 1 | Status: SHIPPED | OUTPATIENT
Start: 2019-03-21 | End: 2019-06-20 | Stop reason: SDUPTHER

## 2019-04-04 ENCOUNTER — TELEPHONE (OUTPATIENT)
Dept: FAMILY MEDICINE | Facility: CLINIC | Age: 44
End: 2019-04-04

## 2019-06-20 RX ORDER — ATENOLOL AND CHLORTHALIDONE TABLET 100; 25 MG/1; MG/1
0.5 TABLET ORAL DAILY
Qty: 45 TABLET | Refills: 1 | Status: SHIPPED | OUTPATIENT
Start: 2019-06-20 | End: 2019-12-23

## 2019-06-24 ENCOUNTER — OFFICE VISIT (OUTPATIENT)
Dept: FAMILY MEDICINE | Facility: CLINIC | Age: 44
End: 2019-06-24
Payer: COMMERCIAL

## 2019-06-24 VITALS
RESPIRATION RATE: 16 BRPM | DIASTOLIC BLOOD PRESSURE: 74 MMHG | HEART RATE: 71 BPM | BODY MASS INDEX: 32.99 KG/M2 | HEIGHT: 65 IN | SYSTOLIC BLOOD PRESSURE: 112 MMHG | WEIGHT: 198 LBS

## 2019-06-24 DIAGNOSIS — G47.9 SLEEP DISORDER: ICD-10-CM

## 2019-06-24 DIAGNOSIS — R53.83 OTHER FATIGUE: ICD-10-CM

## 2019-06-24 DIAGNOSIS — R06.83 SNORING: ICD-10-CM

## 2019-06-24 DIAGNOSIS — I10 ESSENTIAL HYPERTENSION: Primary | ICD-10-CM

## 2019-06-24 DIAGNOSIS — E03.9 ACQUIRED HYPOTHYROIDISM: ICD-10-CM

## 2019-06-24 PROCEDURE — 3008F BODY MASS INDEX DOCD: CPT | Mod: ,,, | Performed by: INTERNAL MEDICINE

## 2019-06-24 PROCEDURE — 99214 PR OFFICE/OUTPT VISIT, EST, LEVL IV, 30-39 MIN: ICD-10-PCS | Mod: ,,, | Performed by: INTERNAL MEDICINE

## 2019-06-24 PROCEDURE — 3074F PR MOST RECENT SYSTOLIC BLOOD PRESSURE < 130 MM HG: ICD-10-PCS | Mod: ,,, | Performed by: INTERNAL MEDICINE

## 2019-06-24 PROCEDURE — 99214 OFFICE O/P EST MOD 30 MIN: CPT | Mod: ,,, | Performed by: INTERNAL MEDICINE

## 2019-06-24 PROCEDURE — 3078F PR MOST RECENT DIASTOLIC BLOOD PRESSURE < 80 MM HG: ICD-10-PCS | Mod: ,,, | Performed by: INTERNAL MEDICINE

## 2019-06-24 PROCEDURE — 3008F PR BODY MASS INDEX (BMI) DOCUMENTED: ICD-10-PCS | Mod: ,,, | Performed by: INTERNAL MEDICINE

## 2019-06-24 PROCEDURE — 3078F DIAST BP <80 MM HG: CPT | Mod: ,,, | Performed by: INTERNAL MEDICINE

## 2019-06-24 PROCEDURE — 3074F SYST BP LT 130 MM HG: CPT | Mod: ,,, | Performed by: INTERNAL MEDICINE

## 2019-06-24 NOTE — LETTER
June 24, 2019      [facility]  [address]  [facility] [address]  Phone: [phone]  Fax: [phone]       Patient: George Denny   YOB: 1975  Date of Visit: 06/24/2019    To Whom It May Concern:    Caroline Denny  was at [facility] on 06/24/2019.She was sick to be off work from 6.21.19.  She may return to work/school on 6/25/19 with no restrictions. If you have any questions or concerns, or if I can be of further assistance, please do not hesitate to contact me.    Sincerely,        Elias Blandon MD

## 2019-06-24 NOTE — PATIENT INSTRUCTIONS
What is High Blood Pressure?  High blood pressure (also called hypertension) is known as the silent killer. This is because most of the time it doesnt cause symptoms. In fact, many people dont know they have it until other problems develop. In most cases, high blood pressure cant be cured. Its a disease that often requires lifelong treatment. The good news is that it can be managed.  Understanding blood pressure  The circulatory system is made up of the heart and blood vessels that carry blood through the body. Your heart is the pump for this system. With each heartbeat (contraction), the heart sends blood out through large blood vessels called arteries. Blood pressure is a measure of how hard the moving blood pushes against the walls of the arteries.  High blood pressure can harm your health  In a healthy blood vessel, the blood moves smoothly through the vessel and puts normal pressure on the vessel walls.       High blood pressure occurs when blood pushes too hard against artery walls. This causes damage to the artery walls and then the formation of scar tissue as it heals. This makes the arteries stiff and weak. Plaque sticks to the scarred tissue narrowing and hardening the arteries. High blood pressure also causes your heart to work harder to get blood out to the body. High blood pressure raises your risk of heart attack, also known as acute myocardial infarction, or AMI, and stroke. It can also lead to kidney disease, and blindness.  Measuring blood pressure  An example of a blood pressure measurement is 120/70 (120 over 70). The top number is the pressure of blood against the artery walls during a heartbeat (systolic). The bottom number is the pressure of blood against artery walls between heartbeats (diastolic). Talk with your healthcare provider to find out what your blood pressure goals should be.   Controlling blood pressure  If your blood pressure is too high, work with your doctor on a plan for  "lowering it. Below are steps you can take that will help lower your blood pressure.  · Choose heart-healthy foods. Eating healthier meals helps you control your blood pressure. Ask your doctor about the DASH eating plan. This plan helps reduce blood pressure by limiting the amount of sodium (salt) you have in your diet. DASH also encourages eating plenty of fruits and vegetables, low-fat or non-fat dairy, whole-grains, and foods high in fiber, and low in fat.  · Reduce sodium. Reducing sodium in your diet reduces fluid retention. Fluid retention caused by too much salt increases blood volume and blood pressure. The American Heart Associations (AHA) "ideal" sodium intake recommendation is 1,500 milligrams per day.  However, since American's eat so much salt, the AHA says a positive change can occur by cutting back to even 2,400 milligrams of sodium a day.  · Maintain a healthy weight. Being overweight makes you more likely to have high blood pressure. Losing excess weight helps lower blood pressure.  · Exercise regularly. Daily exercise helps your heart and blood vessels work better and stay healthier. It can help lower your blood pressure.  · Stop smoking. Smoking increases blood pressure and damages blood vessels.  · Limit alcohol. Drinking too much alcohol can raise blood pressure. Men should have no more than 2 drinks a day. Women should have no more than 1. (A drink is equal to 1 beer, or a small glass of wine, or a shot of liquor.)  · Control stress. Stress makes your heart work harder and beat faster. Controlling stress helps you control your blood pressure.  Facts about high blood pressure  · Feeling OK does not mean that blood pressure is under control. Likewise, feeling bad doesnt mean its out of control. The only way to know for sure is to check your pressure regularly.  · Medicine is only one part of controlling high blood pressure. You also need to manage your weight, get regular exercise, and adjust " your eating habits.  · High blood pressure is usually a lifelong problem. But it can be controlled with healthy lifestyle changes and medicine.  · Hypertension is not the same as stress. Although stress may be a factor in high blood pressure, its only one part of the story.  · Blood pressure medicines need to be taken every day. Stopping suddenly may cause a dangerous increase in pressure.   Date Last Reviewed: 4/27/2016  © 9975-6944 [employer]. [address] [place] [address], [place] [address]. All rights reserved. This information is not intended as a substitute for professional medical care. Always follow your healthcare professional's instructions.        Established High Blood Pressure    High blood pressure (hypertension) is a chronic disease. Often, healthcare providers dont know what causes it. But it can be caused by certain health conditions and medicines.  If you have high blood pressure, you may not have any symptoms. If you do have symptoms, they may include headache, dizziness, changes in your vision, chest pain, and shortness of breath. But even without symptoms, high blood pressure thats not treated raises your risk for heart attack and stroke. High blood pressure is a serious health risk and shouldnt be ignored.  A blood pressure reading is made up of two numbers: a higher number over a lower number. The top number is the systolic pressure. The bottom number is the diastolic pressure. A normal blood pressure is a systolic pressure of  less than 120 over a diastolic pressure of less than 80. You will see your blood pressure readings written together. For example, a person with a systolic pressure of 188 and a diastolic pressure of 78 will have 118/78 written in the medical record.  High blood pressure is when either the top number is 140 or higher, or the bottom number is 90 or higher. This must be the result when taking your blood pressure a number of times. The blood pressures between  normal and high are called prehypertension.  Home care  If you have high blood pressure, you should do what is listed below to lower your blood pressure. If you are taking medicines for high blood pressure, these methods may reduce or end your need for medicines in the future.  · Begin a weight-loss program if you are overweight.  · Cut back on how much salt you get in your diet. Heres how to do this:  ¨ Dont eat foods that have a lot of salt. These include olives, pickles, smoked meats, and salted potato chips.  ¨ Dont add salt to your food at the table.  ¨ Use only small amounts of salt when cooking.  · Start an exercise program. Talk with your healthcare provider about the type of exercise program that would be best for you. It doesn't have to be hard. Even brisk walking for 20 minutes 3 times a week is a good form of exercise.  · Dont take medicines that stimulate the heart. This includes many over-the-counter cold and sinus decongestant pills and sprays, as well as diet pills. Check the warnings about hypertension on the label. Before buying any over-the-counter medicines or supplements, always ask the pharmacist about the product's potential interaction with your high blood pressure and your high blood pressure medicines.  · Stimulants such as amphetamine or cocaine could be deadly for someone with high blood pressure. Never take these.  · Limit how much caffeine you get in your diet. Switch to caffeine-free products.  · Stop smoking. If you are a long-time smoker, this can be hard. Talk to your healthcare provider about medicines and nicotine replacement options to help you. Also, enroll in a stop-smoking program to make it more likely that you will quit for good.  · Learn how to handle stress. This is an important part of any program to lower blood pressure. Learn about relaxation methods like meditation, yoga, or biofeedback.  · If your provider prescribed medicines, take them exactly as directed.  Missing doses may cause your blood pressure get out of control.  · If you miss a dose or doses, check with your healthcare provider or pharmacist about what to do.  · Consider buying an automatic blood pressure machine. Ask your provider for a recommendation. You can get one of these at most pharmacies.     The American Heart Association recommends the following guidelines for home blood pressure monitoring:  · Don't smoke or drink coffee for 30 minutes before taking your blood pressure.  · Go to the bathroom before the test.  · Relax for 5 minutes before taking the measurement.  · Sit with your back supported (don't sit on a couch or soft chair); keep your feet on the floor uncrossed. Place your arm on a solid flat surface (like a table) with the upper part of the arm at heart level. Place the middle of the cuff directly above the eye of the elbow. Check the monitor's instruction manual for an illustration.  · Take multiple readings. When you measure, take 2 to 3 readings one minute apart and record all of the results.  · Take your blood pressure at the same time every day, or as your healthcare provider recommends.  · Record the date, time, and blood pressure reading.  · Take the record with you to your next medical appointment. If your blood pressure monitor has a built-in memory, simply take the monitor with you to your next appointment.  · Call your provider if you have several high readings. Don't be frightened by a single high blood pressure reading, but if you get several high readings, check in with your healthcare provider.  · Note: When blood pressure reaches a systolic (top number) of 180 or higher OR diastolic (bottom number) of 110 or higher, seek emergency medical treatment.  Follow-up care  You will need to see your healthcare provider regularly. This is to check your blood pressure and to make changes to your medicines. Make a follow-up appointment as directed. Bring the record of your home blood  pressure readings to the appointment.  When to seek medical advice  Call your healthcare provider right away if any of these occur:  · Blood pressure reaches a systolic (upper number) of 180 or higher OR a diastolic (bottom number) of 110 or higher  · Chest pain or shortness of breath  · Severe headache  · Throbbing or rushing sound in the ears  · Nosebleed  · Sudden severe pain in your belly (abdomen)  · Extreme drowsiness, confusion, or fainting  · Dizziness or spinning sensation (vertigo)  · Weakness of an arm or leg or one side of the face  · You have problems speaking or seeing   Date Last Reviewed: 12/1/2016  © 6815-2272 [employer]. [address] [place] [address], [place] [address]. All rights reserved. This information is not intended as a substitute for professional medical care. Always follow your healthcare professional's instructions.

## 2019-06-24 NOTE — PROGRESS NOTES
Subjective:       Patient ID: George Denny is a 43 y.o. female.    Chief Complaint: Hypertension; Thyroid Problem; Fatigue; and Snoring    Ms. Charles Seaman is a 43-year-old -American female who comes for follow-up. Underlying medical issues of hypertension currently on atenolol/chlorthalidone and hypothyroidism currently on thyroxine supplements have been noted.    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 Her job involves getting up at 3 AM in the morning and starting her work at 5:00 and then coming back at 11 AM and then going back to work at 3 PM for the second shift. This entails a lot of disruption in her sleep pattern. She feels somewhat tired.    There is a question of snoring at night and possibly some witnessed apneas. Blood pressures are under control generally.    The snoring episodes and fatigue has been going on for several months at this point. She did lose some weight.    Hypertension   This is a chronic problem. The current episode started more than 1 month ago. The problem is controlled. Associated symptoms include malaise/fatigue. Pertinent negatives include no chest pain, headaches, palpitations or shortness of breath. Risk factors for coronary artery disease include dyslipidemia. Past treatments include beta blockers and diuretics. The current treatment provides moderate improvement. Compliance problems include psychosocial issues.  Identifiable causes of hypertension include a thyroid problem. There is no history of pheochromocytoma.   Thyroid Problem   Presents for follow-up visit. Symptoms include fatigue. Patient reports no anxiety, constipation, diarrhea, heat intolerance, leg swelling, menstrual problem, palpitations, weight gain or weight loss. The symptoms have been stable.   Fatigue   This is a chronic problem. The current episode started more than 1 month ago. The problem occurs constantly. The problem  has been unchanged. Associated symptoms include fatigue. Pertinent negatives include no abdominal pain, anorexia, arthralgias, chest pain, chills, congestion, coughing, fever, headaches, joint swelling or rash. Associated symptoms comments: snoring. She has tried sleep for the symptoms. The treatment provided no relief.       Past Medical History:   Diagnosis Date    GERD (gastroesophageal reflux disease)     Hypertension     Thyroid disease     as per Dr Padilla     Social History     Socioeconomic History    Marital status:      Spouse name: Juan    Number of children: 4    Years of education: Not on file    Highest education level: Not on file   Occupational History    Occupation: [employer]     Employer: [employer]     Comment: [employer]    Occupation: PSE/     Employer: [employer]   Social Needs    Financial resource strain: Not on file    Food insecurity:     Worry: Not on file     Inability: Not on file    Transportation needs:     Medical: Not on file     Non-medical: Not on file   Tobacco Use    Smoking status: Never Smoker    Smokeless tobacco: Never Used   Substance and Sexual Activity    Alcohol use: Yes    Drug use: No    Sexual activity: Yes     Partners: Male   Lifestyle    Physical activity:     Days per week: Not on file     Minutes per session: Not on file    Stress: Very much   Relationships    Social connections:     Talks on phone: Not on file     Gets together: Not on file     Attends Scientologist service: Not on file     Active member of club or organization: Not on file     Attends meetings of clubs or organizations: Not on file     Relationship status: Not on file   Other Topics Concern    Not on file   Social History Narrative    Not on file     Past Surgical History:   Procedure Laterality Date     SECTION      HERNIA REPAIR      HYSTERECTOMY       Family History   Problem Relation Age of Onset    Hypertension  "Mother     Heart disease Mother     Diabetes Father     Heart disease Father     Hypertension Father     Gout Father        Review of Systems   Constitutional: Positive for fatigue and malaise/fatigue. Negative for activity change, chills, fever, unexpected weight change (2 lbs), weight gain and weight loss.   HENT: Negative for congestion, postnasal drip and sinus pressure.         Snoring   Eyes: Negative for pain, discharge and visual disturbance.   Respiratory: Positive for apnea (suspected). Negative for cough, chest tightness and shortness of breath.    Cardiovascular: Negative for chest pain, palpitations and leg swelling.        Hypertension   Gastrointestinal: Negative for abdominal distention, abdominal pain, anal bleeding, anorexia, constipation and diarrhea.   Endocrine: Negative for heat intolerance and polyphagia.        Armor thyroid is probably  not on insurance or it is expensive. She has been changed to NP thyroid.   Genitourinary: Negative for difficulty urinating, dysuria and menstrual problem.        Hysterectomy   Musculoskeletal: Negative for arthralgias, back pain and joint swelling.   Skin: Negative for color change, pallor and rash.   Allergic/Immunologic: Negative for environmental allergies, food allergies and immunocompromised state.   Neurological: Negative for dizziness, syncope, light-headedness and headaches.   Hematological: Negative for adenopathy. Does not bruise/bleed easily.   Psychiatric/Behavioral: Positive for sleep disturbance. Negative for agitation and confusion. The patient is not nervous/anxious.          Objective:      Blood pressure 112/74, pulse 71, height 5' 5" (1.651 m), weight 89.8 kg (198 lb). Body mass index is 32.95 kg/m².  Physical Exam   Constitutional: She appears well-developed. She is cooperative. No distress.   Simple obesity with a BMI of 32.   HENT:   Head: Normocephalic and atraumatic.   Mallampati score 3-4    No significant nasal trumpeting "   Eyes: Pupils are equal, round, and reactive to light. Conjunctivae, EOM and lids are normal. Lids are everted and swept, no foreign bodies found. Right pupil is round and reactive. Left pupil is round and reactive.   Neck: Trachea normal and normal range of motion. Neck supple.   Cardiovascular: Normal rate, regular rhythm, S1 normal, S2 normal and normal heart sounds.   Pulmonary/Chest: Breath sounds normal.   Abdominal: Soft. Bowel sounds are normal. There is no rigidity and no guarding.   Musculoskeletal: Normal range of motion. She exhibits no tenderness or deformity.   Lymphadenopathy:     She has no cervical adenopathy.   Neurological: She is alert.   Skin: Skin is warm and dry.   Psychiatric: She has a normal mood and affect.   Nursing note and vitals reviewed.        Assessment:       1. Essential hypertension    2. Acquired hypothyroidism    3. Other fatigue    4. Snoring    5. Sleep disorder           No visits with results within 3 Month(s) from this visit.   Latest known visit with results is:   Orders Only on 11/17/2017   Component Date Value Ref Range Status    Cholesterol 11/17/2017 202* <200 mg/dL Final    HDL 11/17/2017 58  >50 mg/dL Final    Triglycerides 11/17/2017 162* <150 mg/dL Final    LDL Cholesterol 11/17/2017 116* mg/dL (calc) Final    Hdl/Cholesterol Ratio 11/17/2017 3.5  <5.0 (calc) Final    Non HDL Chol. (LDL+VLDL) 11/17/2017 144* <130 mg/dL (calc) Final    Creatinine, Random Ur 11/17/2017 88  20 - 320 mg/dL Final    Microalb, Ur 11/17/2017 0.2  See Note: mg/dL Final    Microalb Creat Ratio 11/17/2017 2  <30 mcg/mg creat Final    Glucose 11/17/2017 98  65 - 99 mg/dL Final    BUN, Bld 11/17/2017 12  7 - 25 mg/dL Final    Creatinine 11/17/2017 0.74  0.50 - 1.10 mg/dL Final    eGFR if non African American 11/17/2017 99  > OR = 60 mL/min/1.73m2 Final    eGFR if  11/17/2017 114  > OR = 60 mL/min/1.73m2 Final    BUN/Creatinine Ratio 11/17/2017 NOT APPLICABLE  6  - 22 (calc) Final    Sodium 11/17/2017 139  135 - 146 mmol/L Final    Potassium 11/17/2017 4.8  3.5 - 5.3 mmol/L Final    Chloride 11/17/2017 101  98 - 110 mmol/L Final    CO2 11/17/2017 33* 20 - 31 mmol/L Final    Calcium 11/17/2017 9.9  8.6 - 10.2 mg/dL Final    Total Protein 11/17/2017 7.8  6.1 - 8.1 g/dL Final    Albumin 11/17/2017 4.6  3.6 - 5.1 g/dL Final    Globulin, Total 11/17/2017 3.2  1.9 - 3.7 g/dL (calc) Final    Albumin/Globulin Ratio 11/17/2017 1.4  1.0 - 2.5 (calc) Final    Total Bilirubin 11/17/2017 0.5  0.2 - 1.2 mg/dL Final    Alkaline Phosphatase 11/17/2017 48  33 - 115 U/L Final    AST 11/17/2017 15  10 - 30 U/L Final    ALT 11/17/2017 9  6 - 29 U/L Final         Plan:           Essential hypertension  -     Comprehensive metabolic panel; Future; Expected date: 06/24/2019  -     Lipid panel; Future; Expected date: 06/24/2019  -     Microalbumin/creatinine urine ratio; Future; Expected date: 06/24/2019    Acquired hypothyroidism  -     TSH; Future; Expected date: 06/24/2019    Other fatigue  -     CBC auto differential; Future; Expected date: 06/24/2019  -     Ambulatory referral to Sleep Disorders    Snoring  -     Ambulatory referral to Sleep Disorders    Sleep disorder  -     Ambulatory referral to Sleep Disorders    Patient has been advised to watch diet and exercise. Avoid fried and fatty food. Compliance to medications and follow up urged.    Patient also has been experiencing fatigue. She gets a rather poor sleep at night. Her  has noticed that she snores at night. On occasions or two he had nudge her up from apneic episodes. She suspects that she might have sleep apnea.    Patient's new job in the [email or web address] as a complicated but timing. She has to get up 3.00 in the morning and come back home around 11 AM. Then she has to go back to work around 4:05 PM to 7 PM. This creates a lot of travel and sleep disturbance.    She had to take time off for the last 3 days  because of extreme fatigue.    Labs pending at this point.    I've advised her to remain hydrated. Days ago after roadmap followed by exercise.    Patient seen with chaperone.    Fup 4 months      Current Outpatient Medications:     atenolol-chlorthalidone (TENORETIC) 100-25 mg per tablet, Take 0.5 tablets by mouth once daily., Disp: 45 tablet, Rfl: 1    thyroid (NP THYROID) Tab, Take 30 mg by mouth 2 (two) times daily., Disp: , Rfl:

## 2019-12-23 RX ORDER — ATENOLOL AND CHLORTHALIDONE TABLET 100; 25 MG/1; MG/1
TABLET ORAL
Qty: 45 TABLET | Refills: 0 | Status: SHIPPED | OUTPATIENT
Start: 2019-12-23 | End: 2020-04-20

## 2020-04-20 RX ORDER — ATENOLOL AND CHLORTHALIDONE TABLET 100; 25 MG/1; MG/1
TABLET ORAL
Qty: 45 TABLET | Refills: 0 | Status: SHIPPED | OUTPATIENT
Start: 2020-04-20 | End: 2020-06-25 | Stop reason: CLARIF

## 2020-05-26 ENCOUNTER — LAB VISIT (OUTPATIENT)
Dept: PRIMARY CARE CLINIC | Facility: CLINIC | Age: 45
End: 2020-05-26
Payer: OTHER GOVERNMENT

## 2020-05-26 DIAGNOSIS — R05.9 COUGH: Primary | ICD-10-CM

## 2020-05-26 PROCEDURE — U0003 INFECTIOUS AGENT DETECTION BY NUCLEIC ACID (DNA OR RNA); SEVERE ACUTE RESPIRATORY SYNDROME CORONAVIRUS 2 (SARS-COV-2) (CORONAVIRUS DISEASE [COVID-19]), AMPLIFIED PROBE TECHNIQUE, MAKING USE OF HIGH THROUGHPUT TECHNOLOGIES AS DESCRIBED BY CMS-2020-01-R: HCPCS

## 2020-05-28 ENCOUNTER — TELEPHONE (OUTPATIENT)
Dept: EMERGENCY MEDICINE | Facility: HOSPITAL | Age: 45
End: 2020-05-28

## 2020-05-28 LAB — SARS-COV-2 RNA RESP QL NAA+PROBE: NOT DETECTED

## 2020-06-25 ENCOUNTER — HOSPITAL ENCOUNTER (EMERGENCY)
Facility: HOSPITAL | Age: 45
Discharge: HOME | End: 2020-06-25
Attending: EMERGENCY MEDICINE
Payer: COMMERCIAL

## 2020-06-25 VITALS
OXYGEN SATURATION: 100 % | TEMPERATURE: 99 F | DIASTOLIC BLOOD PRESSURE: 66 MMHG | WEIGHT: 213 LBS | HEART RATE: 68 BPM | HEIGHT: 65 IN | SYSTOLIC BLOOD PRESSURE: 135 MMHG | BODY MASS INDEX: 35.49 KG/M2 | RESPIRATION RATE: 18 BRPM

## 2020-06-25 DIAGNOSIS — G51.0 LEFT-SIDED BELL'S PALSY: Primary | ICD-10-CM

## 2020-06-25 LAB — GLUCOSE SERPL-MCNC: 89 MG/DL (ref 70–110)

## 2020-06-25 PROCEDURE — 82962 GLUCOSE BLOOD TEST: CPT

## 2020-06-25 PROCEDURE — 99284 EMERGENCY DEPT VISIT MOD MDM: CPT | Mod: 25

## 2020-06-25 RX ORDER — ACYCLOVIR 800 MG/1
800 TABLET ORAL
Qty: 50 TABLET | Refills: 0 | Status: SHIPPED | OUTPATIENT
Start: 2020-06-25 | End: 2020-07-09

## 2020-06-25 RX ORDER — ATENOLOL AND CHLORTHALIDONE TABLET 100; 25 MG/1; MG/1
0.5 TABLET ORAL DAILY
COMMUNITY
End: 2020-07-02 | Stop reason: SDUPTHER

## 2020-06-25 RX ORDER — PREDNISONE 20 MG/1
60 TABLET ORAL DAILY
Qty: 15 TABLET | Refills: 0 | Status: SHIPPED | OUTPATIENT
Start: 2020-06-25 | End: 2020-06-30

## 2020-06-25 NOTE — Clinical Note
George Denny was seen and treated in our emergency department on 6/25/2020.  She may return to work after being cleared by follow-up physician .       If you have any questions or concerns, please don't hesitate to call.       LPN

## 2020-06-25 NOTE — DISCHARGE INSTRUCTIONS
RETURN TO EMERGENCY DEPARTMENT WITHOUT FAIL, IF YOUR SYMPTOMS WORSEN, IF YOU GET NEW OR DIFFERENT SYMPTOMS, IF YOU ARE UNABLE TO FOLLOW UP AS DIRECTED, OR IF YOU HAVE ANY CONCERNS OR WORRIES.    Take steroids and antivirals as directed.  Get rewetting or lubricating eyedrops and put them in your eye at night.  You may need to keep a patch of your eye to protect it if it does not close.  Follow up with your primary care provider.

## 2020-06-25 NOTE — ED PROVIDER NOTES
Encounter Date: 2020       History     Chief Complaint   Patient presents with    Numbness     This is a 44-year-old female with past medical history of hypertension, gastroesophageal reflux disease who presents emergency department with left-sided facial tingling and numbness since 02:30 in the morning.  She says that she noticed it when she was sleeping last night at about 230 a.m..  When she woke up this morning she said she was gargling and she noticed that the left side of her mouth did not seem to be working and stuff was drooling out of the right side.  She said that she notices that she can not close her left eye all the way and that her forehead on the left side does not seem to be moving as much as the right.  This is that she has some tingling to the left side of her cheek and forehead.  She also notices forming taste in her mouth at times.  She has never had any issues like this in the past.  She denies any numbness tingling or weakness in her arms and legs.  Specifically no left-sided numbness tingling or weakness.  No dysarthria stranding no dysphagia, no diplopia.  No visual changes at all.  No headache associated with the symptoms.        Review of patient's allergies indicates:  No Known Allergies  Past Medical History:   Diagnosis Date    GERD (gastroesophageal reflux disease)     Hypertension     Thyroid disease     as per Dr Padilla     Past Surgical History:   Procedure Laterality Date     SECTION      HERNIA REPAIR      HYSTERECTOMY       Family History   Problem Relation Age of Onset    Hypertension Mother     Heart disease Mother     Diabetes Father     Heart disease Father     Hypertension Father     Gout Father      Social History     Tobacco Use    Smoking status: Never Smoker    Smokeless tobacco: Never Used   Substance Use Topics    Alcohol use: Yes    Drug use: No     Review of Systems   Constitutional: Negative for chills and fever.   HENT: Negative for  sore throat.    Respiratory: Negative for shortness of breath.    Cardiovascular: Negative for chest pain.   Gastrointestinal: Negative for nausea and vomiting.   Genitourinary: Negative for dysuria.   Musculoskeletal: Negative for back pain.   Skin: Negative for rash.   Neurological: Positive for facial asymmetry and numbness. Negative for weakness.   Hematological: Does not bruise/bleed easily.   All other systems reviewed and are negative.      Physical Exam     Initial Vitals [06/25/20 1315]   BP Pulse Resp Temp SpO2   (!) 146/76 75 17 98.5 °F (36.9 °C) 100 %      MAP       --         Physical Exam    Nursing note and vitals reviewed.  Constitutional: She appears well-developed and well-nourished. No distress.   HENT:   Head: Normocephalic and atraumatic.   Mouth/Throat: Oropharynx is clear and moist. No oropharyngeal exudate.   Eyes: Conjunctivae and EOM are normal. Pupils are equal, round, and reactive to light.   Inability to fully close the left eye blinking.   Neck: Neck supple. No tracheal deviation present.   Cardiovascular: Normal rate, regular rhythm, normal heart sounds and intact distal pulses.   No murmur heard.  Pulmonary/Chest: Breath sounds normal. No stridor. No respiratory distress. She has no wheezes. She has no rhonchi. She has no rales.   Abdominal: Soft. She exhibits no distension. There is no abdominal tenderness. There is no rebound and no guarding.   Musculoskeletal: Normal range of motion. No tenderness or edema.   Lymphadenopathy:     She has no cervical adenopathy.   Neurological: She is alert and oriented to person, place, and time. She has normal strength. No cranial nerve deficit or sensory deficit.   When wrinkling of the  forehead the left side of the forehead does not wrinkle as much as the right, forehead is involved.  Patient's speech is normal.  Minimal facial weakness/droop noted with smiling on the left.  No tongue deviation.  No pronator drift.    Skin: Skin is warm and  dry. Capillary refill takes less than 2 seconds. No rash noted. No erythema. No pallor.   Psychiatric: She has a normal mood and affect. Thought content normal.         ED Course   Procedures  Labs Reviewed   POCT GLUCOSE   POCT GLUCOSE MONITORING CONTINUOUS          Imaging Results          CT Head Without Contrast (Final result)  Result time 06/25/20 14:58:00    Final result by Kuldip Linares MD (06/25/20 14:58:00)                 Narrative:    CMS MANDATED QUALITY DATA - CT RADIATION  436    All CT scans at this facility utilize dose modulation, iterative  reconstruction, and/or weight based dosing when appropriate to reduce  radiation dose to as low as reasonably achievable.    CT HEAD WITHOUT CONTRAST    CLINICAL HISTORY:  44 years Female Neuro deficit, acute, stroke suspected    COMPARISON: None    FINDINGS: Negative for acute intracranial hemorrhage, midline shift,  or mass effect. Ventricles and sulci are normal in size. Gray-white  differentiation is maintained. Cerebellar hemispheres and brainstem  are unremarkable.    No calvarial lesion or fracture. Mild mucosal thickening left  maxillary sinus. Mastoid air cells are clear.    IMPRESSION: No CT evidence of acute intracranial pathology.    Electronically Signed by Kuldip AYALA on 6/25/2020 3:02 PM                               Medical Decision Making:   ED Management:  Patient presents emergency department with signs and symptoms consistent with Bell's palsy.  Also considered stroke but felt less likely due to involvement of the forehead minimal risk factors.  Patient does not have any weakness in 1 or legs or any numbness or tingling in the arm leg.  Plan is to treat for Bell's palsy with steroids and antivirals.  Also discussed eye care with the patient rewetting drops lubricating drops and possible patching at night.  She is agreeable for outpatient follow-up with her primary care provider.    I had a detailed discussion with the patient  and/or guardian regarding: The historical points, exam findings, and diagnostic results supporting the discharge diagnosis, lab results, pertinent radiology results, and the need for outpatient follow-up, for definitive care with a family practitioner and to return to the emergency department if symptoms worsen or persist or if there are any questions or concerns that arise at home. All questions have been answered in detail. Strict return to Emergency Department precautions have been provided.    A dictation software program was used for this note.  Please expect some simple typographical  errors in this note.                                  Clinical Impression:       ICD-10-CM ICD-9-CM   1. Left-sided Bell's palsy  G51.0 351.0             ED Disposition Condition    Discharge Stable        ED Prescriptions     Medication Sig Dispense Start Date End Date Auth. Provider    acyclovir (ZOVIRAX) 800 MG Tab Take 1 tablet (800 mg total) by mouth 5 (five) times daily. for 10 days 50 tablet 6/25/2020 7/5/2020 Zan Arias DO    predniSONE (DELTASONE) 20 MG tablet Take 3 tablets (60 mg total) by mouth once daily. for 5 days 15 tablet 6/25/2020 6/30/2020 Zan Arias DO        Follow-up Information     Follow up With Specialties Details Why Contact Info Additional Information    Elias Blandon MD Internal Medicine In 1 week  [address]  [address]  [facility] [address]  [phone]       [facility] Emergency Medicine  If symptoms worsen [address] [address]  [phone] 1st floor                                     Zan Arias DO  06/25/20 3766

## 2020-06-29 DIAGNOSIS — Z12.31 ENCOUNTER FOR SCREENING MAMMOGRAM FOR MALIGNANT NEOPLASM OF BREAST: Primary | ICD-10-CM

## 2020-06-30 ENCOUNTER — OFFICE VISIT (OUTPATIENT)
Dept: FAMILY MEDICINE | Facility: CLINIC | Age: 45
End: 2020-06-30
Payer: COMMERCIAL

## 2020-06-30 ENCOUNTER — HOSPITAL ENCOUNTER (OUTPATIENT)
Dept: RADIOLOGY | Facility: HOSPITAL | Age: 45
Discharge: HOME | End: 2020-06-30
Attending: INTERNAL MEDICINE
Payer: COMMERCIAL

## 2020-06-30 VITALS — WEIGHT: 214.06 LBS | HEIGHT: 65 IN | BODY MASS INDEX: 35.67 KG/M2

## 2020-06-30 VITALS
BODY MASS INDEX: 35.65 KG/M2 | SYSTOLIC BLOOD PRESSURE: 128 MMHG | DIASTOLIC BLOOD PRESSURE: 81 MMHG | HEART RATE: 58 BPM | WEIGHT: 214 LBS | HEIGHT: 65 IN

## 2020-06-30 DIAGNOSIS — Z12.31 ENCOUNTER FOR SCREENING MAMMOGRAM FOR MALIGNANT NEOPLASM OF BREAST: ICD-10-CM

## 2020-06-30 DIAGNOSIS — F43.21 GRIEF REACTION: ICD-10-CM

## 2020-06-30 DIAGNOSIS — G51.0 LEFT-SIDED BELL'S PALSY: Primary | ICD-10-CM

## 2020-06-30 DIAGNOSIS — I10 ESSENTIAL HYPERTENSION: ICD-10-CM

## 2020-06-30 PROCEDURE — 99213 PR OFFICE/OUTPT VISIT, EST, LEVL III, 20-29 MIN: ICD-10-PCS | Mod: S$GLB,,, | Performed by: INTERNAL MEDICINE

## 2020-06-30 PROCEDURE — 3079F PR MOST RECENT DIASTOLIC BLOOD PRESSURE 80-89 MM HG: ICD-10-PCS | Mod: S$GLB,,, | Performed by: INTERNAL MEDICINE

## 2020-06-30 PROCEDURE — 3074F SYST BP LT 130 MM HG: CPT | Mod: S$GLB,,, | Performed by: INTERNAL MEDICINE

## 2020-06-30 PROCEDURE — 3079F DIAST BP 80-89 MM HG: CPT | Mod: S$GLB,,, | Performed by: INTERNAL MEDICINE

## 2020-06-30 PROCEDURE — 77067 SCR MAMMO BI INCL CAD: CPT | Mod: TC,PO

## 2020-06-30 PROCEDURE — 3008F BODY MASS INDEX DOCD: CPT | Mod: S$GLB,,, | Performed by: INTERNAL MEDICINE

## 2020-06-30 PROCEDURE — 3008F PR BODY MASS INDEX (BMI) DOCUMENTED: ICD-10-PCS | Mod: S$GLB,,, | Performed by: INTERNAL MEDICINE

## 2020-06-30 PROCEDURE — 99213 OFFICE O/P EST LOW 20 MIN: CPT | Mod: S$GLB,,, | Performed by: INTERNAL MEDICINE

## 2020-06-30 PROCEDURE — 3074F PR MOST RECENT SYSTOLIC BLOOD PRESSURE < 130 MM HG: ICD-10-PCS | Mod: S$GLB,,, | Performed by: INTERNAL MEDICINE

## 2020-06-30 NOTE — PATIENT INSTRUCTIONS
Joness Palsy    Bell's Palsy is a problem involving the nerve that controls the muscles on one side of the face.  The cause is unknown, but may be related to inflammation of the nerve. Symptoms usually appear only on the affected side. They may include:  · Inability to close the eyelid  · Tearing of the eye  · Facial drooping  · Drooling  · Numbness or pain  · Changes in taste  · Sound sensitivity  Damage to the eye can be a serious problem. The inability to blink can cause the eye to dry out. An ulcer (sore) can then form on the cornea. Also, not blinking means that the eye has no protection from dirt and dust particles.  Treatment involves protecting and moistening the eye. Medicines may also help.  Most persons recover completely within 3 to 6 months. However, the condition sometimes returns months or years later.  Home care  · Get plenty of rest and eat a healthy diet to help yourself recover.  · Use Artificial Tears frequently during the day and at bedtime to prevent drying. These drops are available without prescription at your drug store.  · Wear protective glasses especially when outside to protect from flying debris. Use sunglasses when outdoors.  · Tape the eyelid closed at bedtime with a paper tape (available at your pharmacy). This has a very mild adhesive to avoid injury to the lid. This will protect your eye from injury while you sleep.  · Sometimes medicines are prescribed to reduce inflammation or treat specific viral infections of the nerve. If medicines are prescribed, take them exactly as directed. Usually there is a 10-day course of medication that is started as soon as possible. Taking this medicine as prescribed will help with a full recovery.  · Use low heat, for example from a heating pad, on the affected area. This can help reduce pain and swelling.  · If you are experiencing sever pain, contact your health care provider.  Follow-up care  Follow up with your healthcare provider as advised.  If you referred to a specialist, make that appointment promptly.  When to seek medical advice  Call your healthcare provider if any of the following occur:  · Severe eye redness  · Eye pain  · Thick drainage from the eye  · Change in vision (such as double vision or losing vision)  · Fever over 100.4°F (38°C) or as directed by your healthcare provider  · Headache, neck pain, weakness, trouble speaking or walking, or other unexplained symptoms  Date Last Reviewed: 8/23/2015 © 2000-2017 [employer]. [address] [place] [address]. All rights reserved. This information is not intended as a substitute for professional medical care. Always follow your healthcare professional's instructions.        Grief Reaction  Grief is the feeling that we all have when we lose someone or something that has been important in our life. Grief is an unavoidable and normal reaction to this loss, and can last from months to years. The amount of time depends on different factors. These include how close the person was to you, and how much support you have through the grief process. Symptoms can be both physical and emotional.  Physical reactions:  · Loss of appetite or overeating  · Changes in weight  · Trouble getting to sleep or staying asleep  · Hair loss  · Upset stomach, indigestion, heart burn, abdominal pain, cramping, diarrhea  · Sense of trouble breathing  · Trembling, shakiness  Emotional reactions:  · Sadness  · Anxiety  · Feeling depressed or helpless  · Difficulty concentrating  · Detachment or withdrawal from those around you  · Loss of interest in your normal life and work  Home care  · Allow yourself to feel the pain of your loss. For some, this can be a key part of healing grief. Talk about your pain with others who understand. Share good memories that involve the person, pet, or possession  you lost.  · Take time for yourself. Make it a point to do things that you enjoy (gardening, walking in  nature, going to a movie, etc.).  · Take care of your physical body. Eat a balanced diet (low in saturated fat and high in fruits and vegetables) and establish an exercise plan at least 3 times a week for 30 minutes. Even mild-moderate exercise (like brisk walking) can make you feel better. Get plenty of sleep.  · Avoid the use of alcohol and drugs to cover your emotional pain. This only slows down the emotional healing process.  · Do not isolate yourself from others. Have daily contact with family or friends. Talk about your loss to those closest to you.  · For additional support, meet with your //rabbi, a counselor or therapist, or your own doctor.  · Consider joining a grief support group. Ask your doctor or our staff for information on how to find one in your area.  · If you have been prescribed a medicine to help with your symptoms, take it only as directed. Do not use it with alcohol.  Follow-up care  Follow up with your healthcare provider, or as advised.  Call 911  Call 911 if any of these occur:  · Trouble breathing  · Very confused  · Very drowsy or trouble awakening  · Fainting or loss of consciousness  · Rapid heart rate  · Seizure  · New chest pain that becomes more severe, lasts longer, or spreads into your shoulder, arm, neck, jaw or back  When to seek medical advice  Call your healthcare provider right away if any of these occur:  · Worsening symptoms  · Unable to eat or sleep for three days in a row  · Feeling extreme depression, fear, anxiety, or anger toward yourself or others  · Feeling out of control  · Feeling that you may try to harm yourself  · family or friends express concern over your behavior and ask you to get help  Date Last Reviewed: 9/29/2015  © 8160-9993 [employer]. [address] [place] [address], [place] [address]. All rights reserved. This information is not intended as a substitute for professional medical care. Always follow your healthcare professional's  instructions.

## 2020-06-30 NOTE — PROGRESS NOTES
Subjective:       Patient ID: George Denny is a 44 y.o. female.    Chief Complaint: Hospital Follow Up (bells  palsy ), Nordman Palsy, Acute grief, and Hypertension    Miss Vazquez is a pleasant 44-year-old female who went to the emergency room a few days (2020) back when she woke in the morning with complains of numbness and inability to move her left side of the face.  She was diagnosed to have left-sided facial palsy or Bell's palsy.  She was given prescription for acyclovir and prednisone.    Underlying medical issues include hypertension for which she takes atenolol/chlorthalidone and she also takes aspirin.  Hypertension  This is a chronic problem. The current episode started more than 1 year ago. The problem is controlled. Associated symptoms include anxiety. Pertinent negatives include no chest pain, headaches or shortness of breath. Past treatments include beta blockers and diuretics. The current treatment provides moderate improvement. Compliance problems include psychosocial issues.    Depression  Visit Type: initial (Patient's dog  recently.)  Onset of symptoms: in the past 7 days  Progression since onset: unchanged  Patient presents with the following symptoms: anhedonia, compulsions and nervousness/anxiety.  Patient is not experiencing: shortness of breath.  Frequency of symptoms: constantly         Past Medical History:   Diagnosis Date    GERD (gastroesophageal reflux disease)     Hypertension     Thyroid disease     as per Dr Padilla     Social History     Socioeconomic History    Marital status:      Spouse name: Juan    Number of children: 4    Years of education: Not on file    Highest education level: Not on file   Occupational History    Occupation: merchandizer     Employer: [employer]     Comment: [employer]    Occupation: PSE/     Employer: [employer]   Social Needs    Financial resource strain: Not on file    Food insecurity      Worry: Not on file     Inability: Not on file    Transportation needs     Medical: Not on file     Non-medical: Not on file   Tobacco Use    Smoking status: Never Smoker    Smokeless tobacco: Never Used   Substance and Sexual Activity    Alcohol use: Yes    Drug use: No    Sexual activity: Yes     Partners: Male   Lifestyle    Physical activity     Days per week: Not on file     Minutes per session: Not on file    Stress: Very much   Relationships    Social connections     Talks on phone: Not on file     Gets together: Not on file     Attends Sabianist service: Not on file     Active member of club or organization: Not on file     Attends meetings of clubs or organizations: Not on file     Relationship status: Not on file   Other Topics Concern    Not on file   Social History Narrative    Not on file     Past Surgical History:   Procedure Laterality Date     SECTION      HERNIA REPAIR      HYSTERECTOMY       Family History   Problem Relation Age of Onset    Hypertension Mother     Heart disease Mother     Diabetes Father     Heart disease Father     Hypertension Father     Gout Father        Review of Systems   Constitutional: Positive for activity change and appetite change. Negative for chills, fatigue, fever and unexpected weight change.   HENT: Positive for drooling (Right-sided drooling due to Bell's palsy.). Negative for postnasal drip, sneezing and tinnitus.    Respiratory: Negative for cough, chest tightness and shortness of breath.    Cardiovascular: Negative for chest pain and leg swelling.        Underlying hypertension stable on Tenoretic 100/25 and aspirin.  Please note that she did has no diagnosis of coronary artery disease.  Aspirin is optional.   Gastrointestinal: Negative for abdominal distention, abdominal pain, diarrhea, nausea and vomiting.   Endocrine: Negative for heat intolerance, polydipsia and polyphagia.   Genitourinary: Negative for enuresis, hematuria and  "urgency.   Neurological: Positive for numbness. Negative for tremors, syncope and headaches.        Left-sided Bell's palsy with numbness and lack of facial movement.  She finds lack of taste also.   Hematological: Negative for adenopathy. Does not bruise/bleed easily.   Psychiatric/Behavioral: Positive for behavioral problems and depression. The patient is nervous/anxious.         Patient is somewhat down and depressed because her dog 3-year-old  recently.         Objective:      Blood pressure 128/81, pulse (!) 58, height 5' 5" (1.651 m), weight 97.1 kg (214 lb). Body mass index is 35.61 kg/m².  Physical Exam  Constitutional:       General: She is not in acute distress.     Appearance: She is not ill-appearing, toxic-appearing or diaphoretic.   Eyes:      General: No scleral icterus.        Right eye: No discharge.         Left eye: No discharge.   Neck:      Musculoskeletal: Normal range of motion.   Cardiovascular:      Rate and Rhythm: Tachycardia present.   Pulmonary:      Effort: Pulmonary effort is normal.      Breath sounds: Normal breath sounds.   Abdominal:      General: Abdomen is flat.      Palpations: Abdomen is soft.   Skin:     General: Skin is warm.   Neurological:      Mental Status: She is alert.      Comments: Left-sided facial palsy and inability to close the eyelid.  Diminished sensation on the left side.               Assessment:       1. Left-sided Bell's palsy    2. Grief reaction    3. Essential hypertension           Admission on 2020, Discharged on 2020   Component Date Value Ref Range Status    POC Glucose 2020 89  70 - 110 Final   Lab Visit on 2020   Component Date Value Ref Range Status    SARS-CoV2 (COVID-19) Qualitative P* 2020 Not Detected  Not Detected Final         Plan:           Left-sided Bell's palsy    Grief reaction  Comments:  Patient's dog has  recently.  She is feeling acutely stressed.  She needs some time off.    Essential " hypertension  -     Comprehensive metabolic panel; Future; Expected date: 2020  -     Lipid Panel; Future; Expected date: 2020  -     Microalbumin/creatinine urine ratio; Future; Expected date: 2020     Patient will continue and finish her course of acyclovir and prednisone.    I have advised her to continue to massage her left side of the face and do some exercises including trying to bring her cheeks and moving her jaws either side.  She should do I exercises with trying to open and close.    She should put a eye bandage or a cover at nighttime and also artificial tears every 4-6 hours.    I would expect gradual improvement in the next couple of weeks or so.  I will give her a follow-up in a couple of weeks and review her blood pressures also.  Her blood pressure at arrival was slightly elevated but seem to settle down nicely after that.    She is also going through a bit of stress because her dog  recently.  Hopefully she over comes this.  She requests time of for a week or so.  Letter has been given.  Counseling has also been given.  If she continues with grief for stressful might have to consider further evaluation at that point.Patient has been advised to watch diet and exercise. Avoid fried and fatty food. Compliance to medications and follow up urged.      Follow-up in .        Current Outpatient Medications:     acyclovir (ZOVIRAX) 800 MG Tab, Take 1 tablet (800 mg total) by mouth 5 (five) times daily. for 10 days, Disp: 50 tablet, Rfl: 0    aspirin (ASPIR-81 ORAL), Take 1 tablet by mouth once daily., Disp: , Rfl:     atenoloL-chlorthalidone (TENORETIC) 100-25 mg per tablet, Take 0.5 tablets by mouth once daily., Disp: , Rfl:     predniSONE (DELTASONE) 20 MG tablet, Take 3 tablets (60 mg total) by mouth once daily. for 5 days, Disp: 15 tablet, Rfl: 0

## 2020-06-30 NOTE — LETTER
June 30, 2020    George Denny  [address]  [place] [address]         [facility]  [address] [place] [address]  [place] [address]  Phone: [phone]  Fax: [phone] June 30, 2020     Patient: George Denny   YOB: 1975   Date of Visit: 6/30/2020       To Whom It May Concern:    It is my medical opinion that George Denny should remain out of work until july 12th 2020.    If you have any questions or concerns, please don't hesitate to call.    Sincerely,        Elias Blandon MD

## 2020-07-01 LAB
ALBUMIN SERPL-MCNC: 4.4 G/DL (ref 3.8–4.8)
ALBUMIN/CREAT UR: 5 MG/G CREAT (ref 0–29)
ALBUMIN/GLOB SERPL: 1.5 {RATIO} (ref 1.2–2.2)
ALP SERPL-CCNC: 43 IU/L (ref 39–117)
ALT SERPL-CCNC: 18 IU/L (ref 0–32)
AST SERPL-CCNC: 15 IU/L (ref 0–40)
BILIRUB SERPL-MCNC: 0.4 MG/DL (ref 0–1.2)
BUN SERPL-MCNC: 15 MG/DL (ref 6–24)
BUN/CREAT SERPL: 21 (ref 9–23)
CALCIUM SERPL-MCNC: 9.8 MG/DL (ref 8.7–10.2)
CHLORIDE SERPL-SCNC: 94 MMOL/L (ref 96–106)
CHOLEST SERPL-MCNC: 200 MG/DL (ref 100–199)
CO2 SERPL-SCNC: 32 MMOL/L (ref 20–29)
CREAT SERPL-MCNC: 0.7 MG/DL (ref 0.57–1)
CREAT UR-MCNC: 101.6 MG/DL
GLOBULIN SER CALC-MCNC: 2.9 G/DL (ref 1.5–4.5)
GLUCOSE SERPL-MCNC: 132 MG/DL (ref 65–99)
HDLC SERPL-MCNC: 65 MG/DL
LDLC SERPL CALC-MCNC: 94 MG/DL (ref 0–99)
MICROALBUMIN UR-MCNC: 5.3 UG/ML
POTASSIUM SERPL-SCNC: 3.7 MMOL/L (ref 3.5–5.2)
PROT SERPL-MCNC: 7.3 G/DL (ref 6–8.5)
SODIUM SERPL-SCNC: 142 MMOL/L (ref 134–144)
TRIGL SERPL-MCNC: 206 MG/DL (ref 0–149)
VLDLC SERPL CALC-MCNC: 41 MG/DL (ref 5–40)

## 2020-07-02 RX ORDER — ATENOLOL AND CHLORTHALIDONE TABLET 100; 25 MG/1; MG/1
0.5 TABLET ORAL DAILY
Qty: 45 TABLET | Refills: 1 | Status: SHIPPED | OUTPATIENT
Start: 2020-07-02 | End: 2020-07-06 | Stop reason: SDUPTHER

## 2020-07-06 RX ORDER — ATENOLOL AND CHLORTHALIDONE TABLET 100; 25 MG/1; MG/1
0.5 TABLET ORAL DAILY
Qty: 45 TABLET | Refills: 1 | Status: SHIPPED | OUTPATIENT
Start: 2020-07-06 | End: 2020-09-10

## 2020-07-09 ENCOUNTER — OFFICE VISIT (OUTPATIENT)
Dept: FAMILY MEDICINE | Facility: CLINIC | Age: 45
End: 2020-07-09
Payer: COMMERCIAL

## 2020-07-09 VITALS
DIASTOLIC BLOOD PRESSURE: 85 MMHG | HEIGHT: 65 IN | HEART RATE: 84 BPM | WEIGHT: 210 LBS | SYSTOLIC BLOOD PRESSURE: 126 MMHG | BODY MASS INDEX: 34.99 KG/M2

## 2020-07-09 DIAGNOSIS — G51.0 LEFT-SIDED BELL'S PALSY: ICD-10-CM

## 2020-07-09 DIAGNOSIS — I10 ESSENTIAL HYPERTENSION: Primary | ICD-10-CM

## 2020-07-09 DIAGNOSIS — H53.9 VISUAL DISTURBANCE: ICD-10-CM

## 2020-07-09 PROCEDURE — 3079F DIAST BP 80-89 MM HG: CPT | Mod: S$GLB,,, | Performed by: INTERNAL MEDICINE

## 2020-07-09 PROCEDURE — 3074F PR MOST RECENT SYSTOLIC BLOOD PRESSURE < 130 MM HG: ICD-10-PCS | Mod: S$GLB,,, | Performed by: INTERNAL MEDICINE

## 2020-07-09 PROCEDURE — 99213 OFFICE O/P EST LOW 20 MIN: CPT | Mod: S$GLB,,, | Performed by: INTERNAL MEDICINE

## 2020-07-09 PROCEDURE — 3079F PR MOST RECENT DIASTOLIC BLOOD PRESSURE 80-89 MM HG: ICD-10-PCS | Mod: S$GLB,,, | Performed by: INTERNAL MEDICINE

## 2020-07-09 PROCEDURE — 99213 PR OFFICE/OUTPT VISIT, EST, LEVL III, 20-29 MIN: ICD-10-PCS | Mod: S$GLB,,, | Performed by: INTERNAL MEDICINE

## 2020-07-09 PROCEDURE — 3008F PR BODY MASS INDEX (BMI) DOCUMENTED: ICD-10-PCS | Mod: S$GLB,,, | Performed by: INTERNAL MEDICINE

## 2020-07-09 PROCEDURE — 3008F BODY MASS INDEX DOCD: CPT | Mod: S$GLB,,, | Performed by: INTERNAL MEDICINE

## 2020-07-09 PROCEDURE — 3074F SYST BP LT 130 MM HG: CPT | Mod: S$GLB,,, | Performed by: INTERNAL MEDICINE

## 2020-07-09 NOTE — LETTER
July 9, 2020      [facility] / [facility]  [address]  [facility] [address]  Phone: [phone]  Fax: [phone]       Patient: George Denny   YOB: 1975  Date of Visit: 07/09/2020    To Whom It May Concern:    Caroline Denny  was at [facility] on 07/09/2020.  she may return to work on 7/20/2020  With no restrictions. If you have any questions or concerns, or if I can be of further assistance, please do not hesitate to contact me.    Sincerely,  Electronically signed by Elias Blandon MD    Cc Mary Alice Palmer MA

## 2020-07-09 NOTE — PROGRESS NOTES
Subjective:       Patient ID: George Denny is a 44 y.o. female.    Chief Complaint: Hypertension and [place] Palsy (face pain )    Ms. Vazquez is a pleasant 44-year-old female who comes for follow-up.  Last week she was seen for new onset of Bell's palsy.  She has finished her course of acyclovir and prednisone.  She is getting gradually better.  She states that she feels that as if somebody had punched her and her face had felt numb and the sensations and feelings are coming back eventually.    As far as blood pressure is concerned, she states that her blood pressures at home are excellent and pristine.  In the office probably she gets a little anxious and nervous and the tend to be high.    Please note that she has large arms and she needs a large size cuff.  She was initially checked in the office with wrist cuff.    Hypertension  This is a chronic problem. The current episode started more than 1 year ago. The problem has been waxing and waning since onset. Pertinent negatives include no chest pain, headaches or shortness of breath. Risk factors for coronary artery disease include sedentary lifestyle. The current treatment provides moderate improvement.       Past Medical History:   Diagnosis Date    GERD (gastroesophageal reflux disease)     Hypertension     Thyroid disease     as per Dr Padilla     Social History     Socioeconomic History    Marital status:      Spouse name: Juan    Number of children: 4    Years of education: Not on file    Highest education level: Not on file   Occupational History    Occupation: merchandizer     Employer: [employer]     Comment: [employer]    Occupation: PSE/     Employer: [employer]   Social Needs    Financial resource strain: Not on file    Food insecurity     Worry: Not on file     Inability: Not on file    Transportation needs     Medical: Not on file     Non-medical: Not on file   Tobacco Use    Smoking status:  Never Smoker    Smokeless tobacco: Never Used   Substance and Sexual Activity    Alcohol use: Yes    Drug use: No    Sexual activity: Yes     Partners: Male   Lifestyle    Physical activity     Days per week: Not on file     Minutes per session: Not on file    Stress: Very much   Relationships    Social connections     Talks on phone: Not on file     Gets together: Not on file     Attends Hindu service: Not on file     Active member of club or organization: Not on file     Attends meetings of clubs or organizations: Not on file     Relationship status: Not on file   Other Topics Concern    Not on file   Social History Narrative    Not on file     Past Surgical History:   Procedure Laterality Date     SECTION      HERNIA REPAIR      HYSTERECTOMY       Family History   Problem Relation Age of Onset    Hypertension Mother     Heart disease Mother     Diabetes Father     Heart disease Father     Hypertension Father     Gout Father        Review of Systems   Constitutional: Negative for activity change, appetite change, chills, fatigue, fever and unexpected weight change.   HENT: Positive for drooling (Right-sided drooling due to Bell's palsy.). Negative for postnasal drip, sneezing and tinnitus.    Respiratory: Negative for cough, chest tightness and shortness of breath.    Cardiovascular: Negative for chest pain and leg swelling.        Underlying hypertension stable on Tenoretic 100/25 and aspirin.  Please note that she did has no diagnosis of coronary artery disease.  Aspirin is optional.   Gastrointestinal: Negative for abdominal distention, abdominal pain and nausea.   Endocrine: Negative for heat intolerance, polydipsia and polyphagia.   Genitourinary: Negative for enuresis, hematuria and urgency.   Neurological: Positive for numbness. Negative for tremors, syncope and headaches.        Left-sided Bell's palsy with numbness and lack of facial movement.  She finds lack of taste also.  "  Hematological: Negative for adenopathy. Does not bruise/bleed easily.         Objective:      Blood pressure 126/85, pulse 84, height 5' 5" (1.651 m), weight 95.3 kg (210 lb). Body mass index is 34.95 kg/m².  Physical Exam  Constitutional:       General: She is not in acute distress.     Appearance: Normal appearance. She is not ill-appearing, toxic-appearing or diaphoretic.   Eyes:      General:         Left eye: No discharge.   Neck:      Musculoskeletal: No neck rigidity.   Cardiovascular:      Rate and Rhythm: Normal rate and regular rhythm.      Pulses: Normal pulses.      Heart sounds: Normal heart sounds.   Pulmonary:      Effort: Pulmonary effort is normal.      Breath sounds: Normal breath sounds.   Abdominal:      General: Abdomen is flat.      Palpations: Abdomen is soft.   Skin:     General: Skin is warm.   Neurological:      Mental Status: She is alert.           Assessment:       1. Essential hypertension    2. Left-sided Bell's palsy    3. Visual disturbance           Office Visit on 06/30/2020   Component Date Value Ref Range Status    Glucose 06/30/2020 132* 65 - 99 mg/dL Final    BUN, Bld 06/30/2020 15  6 - 24 mg/dL Final    Creatinine 06/30/2020 0.70  0.57 - 1.00 mg/dL Final    eGFR if non African American 06/30/2020 104  >59 mL/min/1.73 Final    eGFR if African American 06/30/2020 120  >59 mL/min/1.73 Final    BUN/Creatinine Ratio 06/30/2020 21  9 - 23 Final    Sodium 06/30/2020 142  134 - 144 mmol/L Final    Potassium 06/30/2020 3.7  3.5 - 5.2 mmol/L Final    Chloride 06/30/2020 94* 96 - 106 mmol/L Final    CO2 06/30/2020 32* 20 - 29 mmol/L Final    Calcium 06/30/2020 9.8  8.7 - 10.2 mg/dL Final    Total Protein 06/30/2020 7.3  6.0 - 8.5 g/dL Final    Albumin 06/30/2020 4.4  3.8 - 4.8 g/dL Final    Globulin, Total 06/30/2020 2.9  1.5 - 4.5 g/dL Final    Albumin/Globulin Ratio 06/30/2020 1.5  1.2 - 2.2 Final    Total Bilirubin 06/30/2020 0.4  0.0 - 1.2 mg/dL Final    Alkaline " Phosphatase 06/30/2020 43  39 - 117 IU/L Final    AST 06/30/2020 15  0 - 40 IU/L Final    ALT 06/30/2020 18  0 - 32 IU/L Final    Cholesterol 06/30/2020 200* 100 - 199 mg/dL Final    Triglycerides 06/30/2020 206* 0 - 149 mg/dL Final    HDL 06/30/2020 65  >39 mg/dL Final    VLDL Cholesterol [place] 06/30/2020 41* 5 - 40 mg/dL Final    LDL Calculated 06/30/2020 94  0 - 99 mg/dL Final    Creatinine, Random Ur 06/30/2020 101.6  Not Estab. mg/dL Final    Microalb, Ur 06/30/2020 5.3  Not Estab. ug/mL Final    Microalb/Crt. Ratio 06/30/2020 5  0 - 29 mg/g creat Final   Admission on 06/25/2020, Discharged on 06/25/2020   Component Date Value Ref Range Status    POC Glucose 06/25/2020 89  70 - 110 Final   Lab Visit on 05/26/2020   Component Date Value Ref Range Status    SARS-CoV2 (COVID-19) Qualitative P* 05/26/2020 Not Detected  Not Detected Final         Plan:           Essential hypertension    Left-sided Bell's palsy  -     Ambulatory referral/consult to Ophthalmology; Future; Expected date: 07/16/2020    Visual disturbance  -     Ambulatory referral/consult to Ophthalmology; Future; Expected date: 07/16/2020      Patient's blood pressures have been checked with the thigh size cough on both the arms and they seem to be doing okay.  At home the even better.    Bell's palsy seems to be getting better.    She will continue to watch her diet and salt intake.  Continue exercise and walking efforts to manage your weight.    She will continue to massage her cheek and do blowing exercises and continue to use a patch at nighttime for her left eye.    Patient has been asked to call the eye doctor for an appointment.  She would like to go back to work only after the appointment is done and she is cleared for work.  That is okay for work allows that.  However I may not be able to fill out any disability forms at this point.  I have also advised the patient to call me back in about 4 weeks time to see how she is doing with  the Bell's palsy.  If there is no significant improvement then may consider referral to physical therapy for electro stimulation.  She has been advised to lose about 4 of 5 lb of the weight when she comes back for follow-up    Pt seen with Ms Whitney as Chaperone      Current Outpatient Medications:     aspirin (ASPIR-81 ORAL), Take 1 tablet by mouth once daily., Disp: , Rfl:     atenoloL-chlorthalidone (TENORETIC) 100-25 mg per tablet, Take 0.5 tablets by mouth once daily., Disp: 45 tablet, Rfl: 1

## 2020-10-08 ENCOUNTER — OFFICE VISIT (OUTPATIENT)
Dept: FAMILY MEDICINE | Facility: CLINIC | Age: 45
End: 2020-10-08
Payer: COMMERCIAL

## 2020-10-08 VITALS
BODY MASS INDEX: 35.82 KG/M2 | HEART RATE: 86 BPM | TEMPERATURE: 98 F | WEIGHT: 215 LBS | HEIGHT: 65 IN | DIASTOLIC BLOOD PRESSURE: 82 MMHG | SYSTOLIC BLOOD PRESSURE: 126 MMHG

## 2020-10-08 DIAGNOSIS — L98.9 SKIN LESIONS: ICD-10-CM

## 2020-10-08 DIAGNOSIS — Z11.59 NEED FOR HEPATITIS C SCREENING TEST: ICD-10-CM

## 2020-10-08 DIAGNOSIS — I10 ESSENTIAL HYPERTENSION: Primary | ICD-10-CM

## 2020-10-08 DIAGNOSIS — G51.0 LEFT-SIDED BELL'S PALSY: ICD-10-CM

## 2020-10-08 DIAGNOSIS — R73.9 ELEVATED BLOOD SUGAR: ICD-10-CM

## 2020-10-08 DIAGNOSIS — Z23 NEEDS FLU SHOT: ICD-10-CM

## 2020-10-08 PROCEDURE — 90471 IMMUNIZATION ADMIN: CPT | Mod: S$GLB,,, | Performed by: INTERNAL MEDICINE

## 2020-10-08 PROCEDURE — 99213 PR OFFICE/OUTPT VISIT, EST, LEVL III, 20-29 MIN: ICD-10-PCS | Mod: 25,S$GLB,, | Performed by: INTERNAL MEDICINE

## 2020-10-08 PROCEDURE — 3008F BODY MASS INDEX DOCD: CPT | Mod: S$GLB,,, | Performed by: INTERNAL MEDICINE

## 2020-10-08 PROCEDURE — 90686 FLU VACCINE (QUAD) GREATER THAN OR EQUAL TO 3YO PRESERVATIVE FREE IM: ICD-10-PCS | Mod: S$GLB,,, | Performed by: INTERNAL MEDICINE

## 2020-10-08 PROCEDURE — 3074F PR MOST RECENT SYSTOLIC BLOOD PRESSURE < 130 MM HG: ICD-10-PCS | Mod: S$GLB,,, | Performed by: INTERNAL MEDICINE

## 2020-10-08 PROCEDURE — 3074F SYST BP LT 130 MM HG: CPT | Mod: S$GLB,,, | Performed by: INTERNAL MEDICINE

## 2020-10-08 PROCEDURE — 99213 OFFICE O/P EST LOW 20 MIN: CPT | Mod: 25,S$GLB,, | Performed by: INTERNAL MEDICINE

## 2020-10-08 PROCEDURE — 90686 IIV4 VACC NO PRSV 0.5 ML IM: CPT | Mod: S$GLB,,, | Performed by: INTERNAL MEDICINE

## 2020-10-08 PROCEDURE — 3079F DIAST BP 80-89 MM HG: CPT | Mod: S$GLB,,, | Performed by: INTERNAL MEDICINE

## 2020-10-08 PROCEDURE — 3079F PR MOST RECENT DIASTOLIC BLOOD PRESSURE 80-89 MM HG: ICD-10-PCS | Mod: S$GLB,,, | Performed by: INTERNAL MEDICINE

## 2020-10-08 PROCEDURE — 3008F PR BODY MASS INDEX (BMI) DOCUMENTED: ICD-10-PCS | Mod: S$GLB,,, | Performed by: INTERNAL MEDICINE

## 2020-10-08 PROCEDURE — 90471 FLU VACCINE (QUAD) GREATER THAN OR EQUAL TO 3YO PRESERVATIVE FREE IM: ICD-10-PCS | Mod: S$GLB,,, | Performed by: INTERNAL MEDICINE

## 2020-10-08 RX ORDER — ATENOLOL AND CHLORTHALIDONE TABLET 100; 25 MG/1; MG/1
0.5 TABLET ORAL DAILY
Qty: 45 TABLET | Refills: 2 | Status: SHIPPED | OUTPATIENT
Start: 2020-10-08 | End: 2021-02-17 | Stop reason: SDUPTHER

## 2020-10-08 NOTE — PATIENT INSTRUCTIONS
Established High Blood Pressure    High blood pressure (hypertension) is a chronic disease. Often, healthcare providers dont know what causes it. But it can be caused by certain health conditions and medicines.  If you have high blood pressure, you may not have any symptoms. If you do have symptoms, they may include headache, dizziness, changes in your vision, chest pain, and shortness of breath. But even without symptoms, high blood pressure thats not treated raises your risk for heart attack and stroke. High blood pressure is a serious health risk and shouldnt be ignored.  A blood pressure reading is made up of two numbers: a higher number over a lower number. The top number is the systolic pressure. The bottom number is the diastolic pressure. A normal blood pressure is a systolic pressure of  less than 120 over a diastolic pressure of less than 80. You will see your blood pressure readings written together. For example, a person with a systolic pressure of 188 and a diastolic pressure of 78 will have 118/78 written in the medical record.  High blood pressure is when either the top number is 140 or higher, or the bottom number is 90 or higher. This must be the result when taking your blood pressure a number of times. The blood pressures between normal and high are called prehypertension.  Home care  If you have high blood pressure, you should do what is listed below to lower your blood pressure. If you are taking medicines for high blood pressure, these methods may reduce or end your need for medicines in the future.  · Begin a weight-loss program if you are overweight.  · Cut back on how much salt you get in your diet. Heres how to do this:  ¨ Dont eat foods that have a lot of salt. These include olives, pickles, smoked meats, and salted potato chips.  ¨ Dont add salt to your food at the table.  ¨ Use only small amounts of salt when cooking.  · Start an exercise program. Talk with your healthcare  provider about the type of exercise program that would be best for you. It doesn't have to be hard. Even brisk walking for 20 minutes 3 times a week is a good form of exercise.  · Dont take medicines that stimulate the heart. This includes many over-the-counter cold and sinus decongestant pills and sprays, as well as diet pills. Check the warnings about hypertension on the label. Before buying any over-the-counter medicines or supplements, always ask the pharmacist about the product's potential interaction with your high blood pressure and your high blood pressure medicines.  · Stimulants such as amphetamine or cocaine could be deadly for someone with high blood pressure. Never take these.  · Limit how much caffeine you get in your diet. Switch to caffeine-free products.  · Stop smoking. If you are a long-time smoker, this can be hard. Talk to your healthcare provider about medicines and nicotine replacement options to help you. Also, enroll in a stop-smoking program to make it more likely that you will quit for good.  · Learn how to handle stress. This is an important part of any program to lower blood pressure. Learn about relaxation methods like meditation, yoga, or biofeedback.  · If your provider prescribed medicines, take them exactly as directed. Missing doses may cause your blood pressure get out of control.  · If you miss a dose or doses, check with your healthcare provider or pharmacist about what to do.  · Consider buying an automatic blood pressure machine. Ask your provider for a recommendation. You can get one of these at most pharmacies.     The American Heart Association recommends the following guidelines for home blood pressure monitoring:  · Don't smoke or drink coffee for 30 minutes before taking your blood pressure.  · Go to the bathroom before the test.  · Relax for 5 minutes before taking the measurement.  · Sit with your back supported (don't sit on a couch or soft chair); keep your feet on  the floor uncrossed. Place your arm on a solid flat surface (like a table) with the upper part of the arm at heart level. Place the middle of the cuff directly above the eye of the elbow. Check the monitor's instruction manual for an illustration.  · Take multiple readings. When you measure, take 2 to 3 readings one minute apart and record all of the results.  · Take your blood pressure at the same time every day, or as your healthcare provider recommends.  · Record the date, time, and blood pressure reading.  · Take the record with you to your next medical appointment. If your blood pressure monitor has a built-in memory, simply take the monitor with you to your next appointment.  · Call your provider if you have several high readings. Don't be frightened by a single high blood pressure reading, but if you get several high readings, check in with your healthcare provider.  · Note: When blood pressure reaches a systolic (top number) of 180 or higher OR diastolic (bottom number) of 110 or higher, seek emergency medical treatment.  Follow-up care  You will need to see your healthcare provider regularly. This is to check your blood pressure and to make changes to your medicines. Make a follow-up appointment as directed. Bring the record of your home blood pressure readings to the appointment.  When to seek medical advice  Call your healthcare provider right away if any of these occur:  · Blood pressure reaches a systolic (upper number) of 180 or higher OR a diastolic (bottom number) of 110 or higher  · Chest pain or shortness of breath  · Severe headache  · Throbbing or rushing sound in the ears  · Nosebleed  · Sudden severe pain in your belly (abdomen)  · Extreme drowsiness, confusion, or fainting  · Dizziness or spinning sensation (vertigo)  · Weakness of an arm or leg or one side of the face  · You have problems speaking or seeing   Date Last Reviewed: 12/1/2016  © 2439-9310 [email or web address]. [address] [place] [address]  [place] [address]. All rights reserved. This information is not intended as a substitute for professional medical care. Always follow your healthcare professional's instructions.

## 2020-10-08 NOTE — PROGRESS NOTES
Subjective:       Patient ID: George Denny is a 45 y.o. female.    Chief Complaint: Hypertension, Sheridan Palsy, and Skin lesions    Ms. Vazquez is a pleasant 44-year-old female who comes for follow-up.     She has underlying hypertension for which she is taking atenolol and chlorthalidone.  Her blood pressures at home seem to be doing okay.    Recently she was diagnosed with Bell's palsy and she had finished her course of acyclovir and prednisone.  She is gradually getting better from that but she states that the Bell's palsy still trying finds its way with her .    Patient is a 45-year-old  female who comes for follow-up.  Underlying medical issues of hypertension has been noted.  She recently is recovering from Bell's palsy and has made a significant recovery.  She is able to swallow food without any pocketing or drooling.  Eyes still show some weakness in the lids.  She is using some eyedrops.  She had seen ophthalmologist/optometrist recently.    She also complains of rash in different parts of her body.  It is in the upper chest below the clavicle, on the right side of neck.  She also has skin tags.  Thus far she has not been formally diagnosed with diabetes.  Her last blood sugar was somewhat elevated but she was on steroids at that point due to Bell's palsy.      Hypertension  This is a chronic problem. The current episode started more than 1 year ago. The problem has been gradually improving since onset. The problem is controlled. Pertinent negatives include no chest pain, headaches, palpitations or shortness of breath. Risk factors for coronary artery disease include sedentary lifestyle. Past treatments include beta blockers and diuretics. The current treatment provides moderate improvement. Compliance problems include psychosocial issues.        Past Medical History:   Diagnosis Date    GERD (gastroesophageal reflux disease)     Hypertension     Thyroid disease     as per Dr Padilla      Social History     Socioeconomic History    Marital status:      Spouse name: Juan washburn    Number of children: 4    Years of education: Not on file    Highest education level: Not on file   Occupational History    Occupation: merchandizer     Employer: [employer]     Comment: [employer]    Occupation: PSE/     Employer: [employer]   Social Needs    Financial resource strain: Not on file    Food insecurity     Worry: Not on file     Inability: Not on file    Transportation needs     Medical: Not on file     Non-medical: Not on file   Tobacco Use    Smoking status: Never Smoker    Smokeless tobacco: Never Used   Substance and Sexual Activity    Alcohol use: Yes    Drug use: No    Sexual activity: Yes     Partners: Male   Lifestyle    Physical activity     Days per week: Not on file     Minutes per session: Not on file    Stress: Very much   Relationships    Social connections     Talks on phone: Not on file     Gets together: Not on file     Attends Quaker service: Not on file     Active member of club or organization: Not on file     Attends meetings of clubs or organizations: Not on file     Relationship status: Not on file   Other Topics Concern    Not on file   Social History Narrative    Children 27 24 19 14     Past Surgical History:   Procedure Laterality Date     SECTION      HERNIA REPAIR      HYSTERECTOMY       Family History   Problem Relation Age of Onset    Hypertension Mother     Heart disease Mother     Diabetes Father     Heart disease Father     Hypertension Father     Gout Father        Review of Systems   Constitutional: Negative for activity change, appetite change, chills, fatigue, fever and unexpected weight change (Gained 5 lb.).   HENT: Negative for congestion, drooling (Right-sided drooling due to Bell's palsy.  This is better), postnasal drip, sneezing and tinnitus.    Eyes: Negative for photophobia,  "discharge and itching.        She was seen also by optometrist Ms.Carolinesandra Mcghee OD for the dry eyes as a complication of Bell's palsy.  She was recommended artificial tears and a gel at nighttime with eye shield.  She is utilizing those recommendations.   Respiratory: Negative for cough, chest tightness and shortness of breath.    Cardiovascular: Negative for chest pain, palpitations and leg swelling.        Underlying hypertension stable on Tenoretic 100/25 and aspirin.  Please note that she did has no diagnosis of coronary artery disease.  Aspirin is optional.   Gastrointestinal: Negative for abdominal distention, abdominal pain and nausea.   Endocrine: Negative for heat intolerance, polydipsia and polyphagia.   Genitourinary: Negative for enuresis, hematuria and urgency.   Musculoskeletal: Negative for back pain and myalgias.   Skin: Positive for rash. Negative for color change and wound.   Neurological: Positive for numbness. Negative for dizziness, tremors, syncope, facial asymmetry (Recovered from Bell's palsy.) and headaches.        Left-sided Bell's palsy with numbness and lack of facial movement.  She finds lack of taste also.  Taste is getting better now.  Drooling has stopped.  She does wear protective glasses for her eyes at this point.   Hematological: Negative for adenopathy. Does not bruise/bleed easily.   Psychiatric/Behavioral: Negative for agitation, behavioral problems and dysphoric mood.         Objective:      Blood pressure 126/82, pulse 86, temperature 97.5 °F (36.4 °C), height 5' 5" (1.651 m), weight 97.5 kg (215 lb). Body mass index is 35.78 kg/m².  Physical Exam  Constitutional:       General: She is not in acute distress.     Appearance: Normal appearance. She is obese. She is not ill-appearing, toxic-appearing or diaphoretic.      Comments: BMI is 35.78.   Eyes:      General:         Left eye: No discharge.   Neck:      Musculoskeletal: No neck rigidity or muscular tenderness. "   Cardiovascular:      Rate and Rhythm: Normal rate and regular rhythm.      Pulses: Normal pulses.      Heart sounds: Normal heart sounds.   Pulmonary:      Effort: Pulmonary effort is normal.      Breath sounds: Normal breath sounds.   Abdominal:      General: Abdomen is flat.      Palpations: Abdomen is soft.      Tenderness: There is no abdominal tenderness.      Hernia: No hernia is present.   Musculoskeletal:      Right lower leg: No edema.      Left lower leg: No edema.   Skin:     General: Skin is warm.      Coloration: Skin is not jaundiced.      Findings: Rash present. No bruising or erythema. Rash is macular. Rash is not vesicular.          Neurological:      Mental Status: She is alert. Mental status is at baseline.      Motor: No weakness or tremor.      Comments: Today I do not see any significant facial asymmetry.  She is able to blow her cheeks.  She has facial sensations intact.  The eyelids are able to close.  She does experience occasional fluttering in her left eyelids.   Psychiatric:         Mood and Affect: Mood normal.         Behavior: Behavior normal.           Assessment:       1. Essential hypertension    2. Elevated blood sugar    3. Skin lesions    4. Left-sided Bell's palsy    5. Need for hepatitis C screening test    6. Needs flu shot           No visits with results within 3 Month(s) from this visit.   Latest known visit with results is:   Office Visit on 06/30/2020   Component Date Value Ref Range Status    Glucose 06/30/2020 132* 65 - 99 mg/dL Final    BUN, Bld 06/30/2020 15  6 - 24 mg/dL Final    Creatinine 06/30/2020 0.70  0.57 - 1.00 mg/dL Final    eGFR if non African American 06/30/2020 104  >59 mL/min/1.73 Final    eGFR if African American 06/30/2020 120  >59 mL/min/1.73 Final    BUN/Creatinine Ratio 06/30/2020 21  9 - 23 Final    Sodium 06/30/2020 142  134 - 144 mmol/L Final    Potassium 06/30/2020 3.7  3.5 - 5.2 mmol/L Final    Chloride 06/30/2020 94* 96 - 106 mmol/L  Final    CO2 06/30/2020 32* 20 - 29 mmol/L Final    Calcium 06/30/2020 9.8  8.7 - 10.2 mg/dL Final    Total Protein 06/30/2020 7.3  6.0 - 8.5 g/dL Final    Albumin 06/30/2020 4.4  3.8 - 4.8 g/dL Final    Globulin, Total 06/30/2020 2.9  1.5 - 4.5 g/dL Final    Albumin/Globulin Ratio 06/30/2020 1.5  1.2 - 2.2 Final    Total Bilirubin 06/30/2020 0.4  0.0 - 1.2 mg/dL Final    Alkaline Phosphatase 06/30/2020 43  39 - 117 IU/L Final    AST 06/30/2020 15  0 - 40 IU/L Final    ALT 06/30/2020 18  0 - 32 IU/L Final    Cholesterol 06/30/2020 200* 100 - 199 mg/dL Final    Triglycerides 06/30/2020 206* 0 - 149 mg/dL Final    HDL 06/30/2020 65  >39 mg/dL Final    VLDL Cholesterol Moiz 06/30/2020 41* 5 - 40 mg/dL Final    LDL Calculated 06/30/2020 94  0 - 99 mg/dL Final    Creatinine, Random Ur 06/30/2020 101.6  Not Estab. mg/dL Final    Microalb, Ur 06/30/2020 5.3  Not Estab. ug/mL Final    Microalb/Crt. Ratio 06/30/2020 5  0 - 29 mg/g creat Final         Plan:           Essential hypertension  Comments:  Blood pressures are doing good.  Orders:  -     atenoloL-chlorthalidone (TENORETIC) 100-25 mg per tablet; Take 0.5 tablets by mouth once daily.  Dispense: 45 tablet; Refill: 2    Elevated blood sugar  -     Hemoglobin A1C; Future; Expected date: 10/09/2020  -     Basic Metabolic Panel; Future; Expected date: 10/09/2020    Skin lesions  -     Ambulatory referral/consult to Dermatology; Future; Expected date: 10/15/2020    Left-sided Bell's palsy  Comments:  This seems to be getting better.    Need for hepatitis C screening test  -     Hepatitis C antibody; Future; Expected date: 10/09/2020    Needs flu shot  -     Influenza - Quadrivalent (PF)      Patient's blood pressures have been checked with the thigh size cough on both the arms and they seem to be doing okay.  At home the even better.    Bell's palsy seems to be getting better.  She has minimal fluttering in the left eyelid and she is putting the  eyedrops.    She will continue to watch her diet and salt intake.  Continue exercise and walking efforts to manage your weight.    She has some rash on the upper shoulder and upper chest region.  This causes her itching.  Thus far she has not been diagnosed with diabetes to indicate convincingly that this could be acanthosis nigricans or possibility of lichen sclerosis.  She has some of the Mohs arising in the body which need to be checked.  I will make a referral to Dermatology.    She has gained 4-5 lb during this COVID-19 times and I have encouraged her to consider losing it.  Holidays will be little difficult.  Combination of sensible diet and exercise.  Exercise 30 times a minute 4 to 5 times a week.    Follow-up in 4 months time.  New prescriptions for blood pressure medication sent to the pharmacy [facility].    Current Outpatient Medications:     aspirin (ASPIR-81 ORAL), Take 1 tablet by mouth once daily., Disp: , Rfl:     atenoloL-chlorthalidone (TENORETIC) 100-25 mg per tablet, Take 0.5 tablets by mouth once daily., Disp: 45 tablet, Rfl: 2

## 2021-02-17 DIAGNOSIS — I10 ESSENTIAL HYPERTENSION: ICD-10-CM

## 2021-02-17 RX ORDER — ATENOLOL AND CHLORTHALIDONE TABLET 100; 25 MG/1; MG/1
0.5 TABLET ORAL DAILY
Qty: 45 TABLET | Refills: 0 | Status: SHIPPED | OUTPATIENT
Start: 2021-02-17 | End: 2021-06-15 | Stop reason: SDUPTHER

## 2021-04-29 ENCOUNTER — PATIENT MESSAGE (OUTPATIENT)
Dept: RESEARCH | Facility: HOSPITAL | Age: 46
End: 2021-04-29

## 2021-06-10 ENCOUNTER — PATIENT MESSAGE (OUTPATIENT)
Dept: FAMILY MEDICINE | Facility: CLINIC | Age: 46
End: 2021-06-10

## 2021-06-12 LAB
BUN SERPL-MCNC: 11 MG/DL (ref 6–24)
BUN/CREAT SERPL: 17 (ref 9–23)
CALCIUM SERPL-MCNC: 10.1 MG/DL (ref 8.7–10.2)
CHLORIDE SERPL-SCNC: 99 MMOL/L (ref 96–106)
CO2 SERPL-SCNC: 23 MMOL/L (ref 20–29)
CREAT SERPL-MCNC: 0.63 MG/DL (ref 0.57–1)
GLUCOSE SERPL-MCNC: 129 MG/DL (ref 65–99)
HBA1C MFR BLD: 6.6 % (ref 4.8–5.6)
HCV AB S/CO SERPL IA: <0.1 S/CO RATIO (ref 0–0.9)
POTASSIUM SERPL-SCNC: 4.4 MMOL/L (ref 3.5–5.2)
SODIUM SERPL-SCNC: 138 MMOL/L (ref 134–144)

## 2021-06-15 ENCOUNTER — OFFICE VISIT (OUTPATIENT)
Dept: FAMILY MEDICINE | Facility: CLINIC | Age: 46
End: 2021-06-15
Payer: COMMERCIAL

## 2021-06-15 VITALS
DIASTOLIC BLOOD PRESSURE: 80 MMHG | WEIGHT: 214 LBS | HEIGHT: 65 IN | BODY MASS INDEX: 35.65 KG/M2 | SYSTOLIC BLOOD PRESSURE: 129 MMHG | HEART RATE: 73 BPM

## 2021-06-15 DIAGNOSIS — I10 ESSENTIAL HYPERTENSION: Primary | Chronic | ICD-10-CM

## 2021-06-15 DIAGNOSIS — R73.9 ELEVATED BLOOD SUGAR: ICD-10-CM

## 2021-06-15 DIAGNOSIS — I10 ESSENTIAL HYPERTENSION: ICD-10-CM

## 2021-06-15 DIAGNOSIS — G51.0 LEFT-SIDED BELL'S PALSY: ICD-10-CM

## 2021-06-15 PROCEDURE — 3079F PR MOST RECENT DIASTOLIC BLOOD PRESSURE 80-89 MM HG: ICD-10-PCS | Mod: S$GLB,,, | Performed by: INTERNAL MEDICINE

## 2021-06-15 PROCEDURE — 3074F PR MOST RECENT SYSTOLIC BLOOD PRESSURE < 130 MM HG: ICD-10-PCS | Mod: S$GLB,,, | Performed by: INTERNAL MEDICINE

## 2021-06-15 PROCEDURE — 3079F DIAST BP 80-89 MM HG: CPT | Mod: S$GLB,,, | Performed by: INTERNAL MEDICINE

## 2021-06-15 PROCEDURE — 99213 OFFICE O/P EST LOW 20 MIN: CPT | Mod: S$GLB,,, | Performed by: INTERNAL MEDICINE

## 2021-06-15 PROCEDURE — 3008F PR BODY MASS INDEX (BMI) DOCUMENTED: ICD-10-PCS | Mod: S$GLB,,, | Performed by: INTERNAL MEDICINE

## 2021-06-15 PROCEDURE — 99213 PR OFFICE/OUTPT VISIT, EST, LEVL III, 20-29 MIN: ICD-10-PCS | Mod: S$GLB,,, | Performed by: INTERNAL MEDICINE

## 2021-06-15 PROCEDURE — 1126F AMNT PAIN NOTED NONE PRSNT: CPT | Mod: S$GLB,,, | Performed by: INTERNAL MEDICINE

## 2021-06-15 PROCEDURE — 3008F BODY MASS INDEX DOCD: CPT | Mod: S$GLB,,, | Performed by: INTERNAL MEDICINE

## 2021-06-15 PROCEDURE — 3074F SYST BP LT 130 MM HG: CPT | Mod: S$GLB,,, | Performed by: INTERNAL MEDICINE

## 2021-06-15 PROCEDURE — 1126F PR PAIN SEVERITY QUANTIFIED, NO PAIN PRESENT: ICD-10-PCS | Mod: S$GLB,,, | Performed by: INTERNAL MEDICINE

## 2021-06-15 RX ORDER — TITANIUM DIOXIDE, OCTINOXATE, ZINC OXIDE 4.61; 1.6; .78 G/40ML; G/40ML; G/40ML
CREAM TOPICAL
COMMUNITY

## 2021-06-15 RX ORDER — ACETAMINOPHEN, DEXTROMETHORPHAN HBR, DOXYLAMINE SUCCINATE, PHENYLEPHRINE HCL 650; 20; 12.5; 1 MG/30ML; MG/30ML; MG/30ML; MG/30ML
SOLUTION ORAL
COMMUNITY

## 2021-06-15 RX ORDER — ATENOLOL AND CHLORTHALIDONE TABLET 100; 25 MG/1; MG/1
0.5 TABLET ORAL DAILY
Qty: 45 TABLET | Refills: 1 | Status: SHIPPED | OUTPATIENT
Start: 2021-06-15 | End: 2022-07-26 | Stop reason: SDUPTHER

## 2021-08-06 ENCOUNTER — PATIENT MESSAGE (OUTPATIENT)
Dept: FAMILY MEDICINE | Facility: CLINIC | Age: 46
End: 2021-08-06

## 2022-03-31 ENCOUNTER — PATIENT MESSAGE (OUTPATIENT)
Dept: FAMILY MEDICINE | Facility: CLINIC | Age: 47
End: 2022-03-31

## 2022-04-05 NOTE — PROGRESS NOTES
April 5, 2022     To whom it May concern:-    George Denny  [address]  [place] [address]  YOB: 1975             [facility]  [address] [place] [address]  [place] [address]  Phone: [phone]  Fax: [phone] Miss George Denny is under my medical care.  She recently lost her father and she is going through a bereavement prrocess.    At this point due to extreme stress and mental anguish as mentioned by her, she is unable to return back to her usual duties and may be afforded relief from work.    Anticipated time of return to work is April 18th, 2022 with full duties.         If you have any questions or concerns, please don't hesitate to call.    Sincerely,        Elias Blandon MD

## 2022-04-05 NOTE — LETTER
April 5, 2022     To whom it May concern:-    George Denny  [address]  [place] [address]  YOB: 1975             [facility]  [address] [place] [address]  [place] [address]  Phone: [phone]  Fax: [phone] Miss George Denny is under my medical care.  She recently lost her father and she is going through a bereavement prrocess.    At this point due to extreme stress and mental anguish as mentioned by her, she is unable to return back to her usual duties and may be afforded relief from work.    Anticipated time of return to work is April 18th, 2022 with full duties.         If you have any questions or concerns, please don't hesitate to call.    Sincerely,        Elias Blandon MD

## 2022-04-08 DIAGNOSIS — Z12.31 ENCOUNTER FOR MAMMOGRAM TO ESTABLISH BASELINE MAMMOGRAM: Primary | ICD-10-CM

## 2022-04-26 ENCOUNTER — HOSPITAL ENCOUNTER (OUTPATIENT)
Dept: RADIOLOGY | Facility: HOSPITAL | Age: 47
Discharge: HOME | End: 2022-04-26
Attending: SPECIALIST
Payer: COMMERCIAL

## 2022-04-26 VITALS — WEIGHT: 214.06 LBS | HEIGHT: 65 IN | BODY MASS INDEX: 35.67 KG/M2

## 2022-04-26 DIAGNOSIS — Z12.31 ENCOUNTER FOR MAMMOGRAM TO ESTABLISH BASELINE MAMMOGRAM: ICD-10-CM

## 2022-04-26 PROCEDURE — 77067 SCR MAMMO BI INCL CAD: CPT | Mod: TC,PO

## 2022-04-26 PROCEDURE — 77063 BREAST TOMOSYNTHESIS BI: CPT | Mod: TC,PO

## 2022-05-16 ENCOUNTER — PATIENT MESSAGE (OUTPATIENT)
Dept: FAMILY MEDICINE | Facility: CLINIC | Age: 47
End: 2022-05-16

## 2022-05-26 ENCOUNTER — PATIENT MESSAGE (OUTPATIENT)
Dept: FAMILY MEDICINE | Facility: CLINIC | Age: 47
End: 2022-05-26

## 2022-07-26 DIAGNOSIS — I10 ESSENTIAL HYPERTENSION: ICD-10-CM

## 2022-07-27 ENCOUNTER — PATIENT MESSAGE (OUTPATIENT)
Dept: FAMILY MEDICINE | Facility: CLINIC | Age: 47
End: 2022-07-27

## 2022-07-27 DIAGNOSIS — I10 ESSENTIAL HYPERTENSION: ICD-10-CM

## 2022-07-27 RX ORDER — ATENOLOL AND CHLORTHALIDONE TABLET 100; 25 MG/1; MG/1
0.5 TABLET ORAL DAILY
Qty: 45 TABLET | Refills: 1 | OUTPATIENT
Start: 2022-07-27

## 2022-07-27 RX ORDER — ATENOLOL AND CHLORTHALIDONE TABLET 100; 25 MG/1; MG/1
0.5 TABLET ORAL DAILY
Qty: 45 TABLET | Refills: 1 | Status: SHIPPED | OUTPATIENT
Start: 2022-07-27 | End: 2022-11-08

## 2022-07-27 RX ORDER — ATENOLOL AND CHLORTHALIDONE TABLET 100; 25 MG/1; MG/1
0.5 TABLET ORAL DAILY
Qty: 45 TABLET | Refills: 1 | Status: CANCELLED | OUTPATIENT
Start: 2022-07-27

## 2022-07-30 NOTE — PROGRESS NOTES
Subjective:       Patient ID: George Denny is a 46 y.o. female.    Chief Complaint: Hypertension, Weight Loss, Elevated blood, Leg Swelling, and Discussion about colonoscopy    Patient is a 46-year-old female comes follow-up.    Underlying medical issues are noted for following:-    1.-hypertension  2.-history of Bell's palsy    She has also lost weight by approximately 9 lb.  She has not particularly try for weight loss.  She sees that she is going through stress because of her 's medical issues and other family member.    Discussed about colonoscopy or colon cancer screening.  Social determinants of health also reviewed.    Patient is going through stress because of her 's health condition.  She has taken family time off.    Hypertension  This is a chronic problem. The current episode started more than 1 year ago. The problem has been gradually improving since onset. The problem is controlled. Pertinent negatives include no chest pain, headaches, palpitations or shortness of breath. Risk factors for coronary artery disease include sedentary lifestyle, stress, obesity and post-menopausal state. Past treatments include beta blockers and diuretics. The current treatment provides moderate improvement. Compliance problems include psychosocial issues.  There is no history of kidney disease, CAD/MI or heart failure. There is no history of chronic renal disease, hypercortisolism, hyperparathyroidism, sleep apnea or a thyroid problem.       Past Medical History:   Diagnosis Date    GERD (gastroesophageal reflux disease)     Hypertension     Thyroid disease     as per Dr Padilla     Social History     Socioeconomic History    Marital status:      Spouse name: Juan denny    Number of children: 4   Occupational History    Occupation: merchandizer     Employer: [employer]     Comment: [employer]    Occupation: PSE/     Employer: [employer]   Tobacco Use     Smoking status: Never Smoker    Smokeless tobacco: Never Used   Substance and Sexual Activity    Alcohol use: Yes    Drug use: No    Sexual activity: Yes     Partners: Male   Social History Narrative    Children 27 24 19 14     Past Surgical History:   Procedure Laterality Date     SECTION      HERNIA REPAIR      HYSTERECTOMY       Family History   Problem Relation Age of Onset    Hypertension Mother     Heart disease Mother     Diabetes Father     Heart disease Father     Hypertension Father     Gout Father        Review of Systems   Constitutional: Positive for unexpected weight change (9 lb of weight.  Attributes to stress..). Negative for activity change, appetite change, chills, fatigue and fever.   HENT: Negative for drooling (History of Bell's palsy which has resolved now.), postnasal drip, sneezing and tinnitus.    Eyes: Negative for photophobia, discharge and itching.        She was seen also by optometrist Ms.Jessica Litzy PURVIS for the dry eyes as a complication of Bell's palsy.  She was recommended artificial tears and a gel at nighttime with eye shield.  She is utilizing those recommendations.   Respiratory: Negative for cough, chest tightness and shortness of breath.    Cardiovascular: Negative for chest pain, palpitations and leg swelling.        Underlying hypertension stable on Tenoretic 100/25 and aspirin.  Please note that she does  Not have a  diagnosis of coronary artery disease.  Aspirin is optional.   Gastrointestinal: Negative for abdominal distention, abdominal pain and nausea.   Endocrine: Negative for heat intolerance, polydipsia and polyphagia.   Genitourinary: Negative for enuresis, hematuria and urgency.   Musculoskeletal: Negative for back pain and myalgias.   Skin: Positive for rash. Negative for color change and wound.        Darkish pigmentation in the nape of neck and left side.?  Acanthosis nigricans   Neurological: Positive for numbness. Negative for dizziness,  "tremors, syncope, facial asymmetry (Recovered from Bell's palsy.) and headaches.        Left-sided Bell's palsy with numbness and lack of facial movement.  She finds lack of taste also.  Taste is getting better now.  Drooling has stopped.  She does wear protective glasses for her eyes at this point.   Hematological: Negative for adenopathy. Does not bruise/bleed easily.   Psychiatric/Behavioral: Positive for behavioral problems. Negative for agitation and dysphoric mood.        Going through issues of stress and anxiety because 's condition and other family issues.  Had to take family time.         Objective:      Blood pressure 133/77, pulse 80, height 5' 5" (1.651 m), weight 93 kg (205 lb). Body mass index is 34.11 kg/m².  Physical Exam  Chaperone present: Not available Chaperone.   Constitutional:       General: She is not in acute distress.     Appearance: Normal appearance. She is obese. She is not ill-appearing, toxic-appearing or diaphoretic.      Comments: BMI is 34.11   Eyes:      General: No scleral icterus.        Left eye: No discharge.   Cardiovascular:      Rate and Rhythm: Normal rate and regular rhythm.      Pulses: Normal pulses.      Heart sounds: Normal heart sounds.   Pulmonary:      Effort: Pulmonary effort is normal.      Breath sounds: Normal breath sounds.   Abdominal:      Palpations: Abdomen is soft.      Tenderness: There is no abdominal tenderness.      Hernia: No hernia is present.   Musculoskeletal:      Cervical back: No rigidity or tenderness. No muscular tenderness.      Right lower leg: No edema.      Left lower leg: No edema.   Skin:     General: Skin is warm.      Coloration: Skin is not jaundiced.      Findings: No bruising or erythema.   Neurological:      Mental Status: She is alert. Mental status is at baseline.      Motor: No weakness or tremor.      Comments: History of Bell's palsy and at this point.   Psychiatric:         Mood and Affect: Mood normal.         " Behavior: Behavior normal.           Assessment:       1. Essential hypertension    2. Weight loss    3. Elevated blood sugar    4. Screening for human immunodeficiency virus    5. Screening for colon cancer           No visits with results within 3 Month(s) from this visit.   Latest known visit with results is:   Office Visit on 10/08/2020   Component Date Value Ref Range Status    Hep C Virus Ab Signal/Cutoff 06/11/2021 <0.1  0.0 - 0.9 s/co ratio Final    Hemoglobin A1c 06/11/2021 6.6 (A) 4.8 - 5.6 % Final    Glucose 06/11/2021 129 (A) 65 - 99 mg/dL Final    BUN 06/11/2021 11  6 - 24 mg/dL Final    Creatinine 06/11/2021 0.63  0.57 - 1.00 mg/dL Final    eGFR if non African American 06/11/2021 107  >59 mL/min/1.73 Final    eGFR if  06/11/2021 124  >59 mL/min/1.73 Final    BUN/Creatinine Ratio 06/11/2021 17  9 - 23 Final    Sodium 06/11/2021 138  134 - 144 mmol/L Final    Potassium 06/11/2021 4.4  3.5 - 5.2 mmol/L Final    Chloride 06/11/2021 99  96 - 106 mmol/L Final    CO2 06/11/2021 23  20 - 29 mmol/L Final    Calcium 06/11/2021 10.1  8.7 - 10.2 mg/dL Final         Plan:           Essential hypertension  Comments:  Blood pressures are stable at this point on atenolol chlorthalidone-100/25 mg-pill per day.  Continue to watch diet and weight management.  Orders:  -     Comprehensive Metabolic Panel; Future; Expected date: 08/02/2022  -     Lipid Panel; Future; Expected date: 08/02/2022  -     Microalbumin/Creatinine Ratio, Urine; Future; Expected date: 08/02/2022    Weight loss  Comments:  Weight loss of 9 lb has been noted which patient attributes to stress.  She has not particularly tried for weight loss.  Check TSH and CBC.  Orders:  -     CBC Auto Differential; Future; Expected date: 08/02/2022  -     TSH; Future; Expected date: 08/02/2022    Elevated blood sugar  Comments:  Check hemoglobin A1c.  Continue to watch diet and weight management.  Orders:  -     Hemoglobin A1C; Future;  Expected date: 08/02/2022    Screening for human immunodeficiency virus  Comments:  Routine nature of this testing has been discussed.  Orders:  -     HIV 1/2 Ag/Ab (4th Gen); Future; Expected date: 08/02/2022    Screening for colon cancer  Comments:  Screening for colon cancer has been ordered.  She has unexplained weight loss by 9 lb which she attributes to stress.  Orders:  -     Ambulatory referral/consult to Gastroenterology; Future; Expected date: 08/02/2022      Patient's blood pressures are stable at this point.    Weight loss has been noted which appears to be on intentional and patient attributes this to stress in life.  Check blood counts and TSH.    She has gone through stress recently because of 's illness and taken family time off.    Preventive care issues also have been reviewed.  This includes colon cancer screening, HIV screening, COVID vaccinations/precautions and tetanus vaccination.  Today on preventive examination I have discussed the following:-    1.-colon cancer screening and options include a full-fledged regular colonoscopy which is considered to be the gold standard and if normal, the next 1 will be in 10 years time.  However the cumbersome nature of this procedure including the prep and need for transportation and taking time off is problematic.    2.-Cologuard testing which is more convenient with no prep time and transportation needs.  This is considered to be approximately 90% sensitive for colon cancer detection with some degree of false-positive results.  If this test is negative, the next testing will be in 3 years time and so on.  If it is positive, it may require a diagnostic colonoscopy.  Which is different from screening colonoscopy.  While technically both the procedures are same, the billing issues are  different between screening which is completely covered and diagnostic for which you will have to meet you deductibles and co-payment.    Patient promises that she will  be 199 lb or less next time.    Discussed about tetanus vaccination and she will defer it to some of the day.    Discussed about preventive care issues including pneumonia vaccine, colon cancer screening, hepatitis-C screening, COVID precautions and vaccination, ophthalmology examination and pneumonia vaccination.    Also discussed about hepatitis-C screening and HIV screening though the patient is at a low risk.    Follow-up in 3-4 months time.    Earlier if needed especially if further weight loss.    Spent abhilash 30 minutes with patient which involved review of pts medical conditions, labs, medications and with 50% of time face-to-face discussion about medical problems, management and any applicable changes.      Current Outpatient Medications:     ascorbic acid, vitamin C, (VITAMIN C) 100 MG tablet, Take 100 mg by mouth once daily., Disp: , Rfl:     aspirin (ASPIR-81 ORAL), Take 1 tablet by mouth once daily., Disp: , Rfl:     atenoloL-chlorthalidone (TENORETIC) 100-25 mg per tablet, Take 0.5 tablets by mouth once daily., Disp: 45 tablet, Rfl: 1    cranberry 400 mg Cap, Take by mouth., Disp: , Rfl:     cyanocobalamin, vitamin B-12, 1,000 mcg TbSR, Take by mouth., Disp: , Rfl:

## 2022-08-01 ENCOUNTER — OFFICE VISIT (OUTPATIENT)
Dept: FAMILY MEDICINE | Facility: CLINIC | Age: 47
End: 2022-08-01
Payer: COMMERCIAL

## 2022-08-01 VITALS
HEIGHT: 65 IN | SYSTOLIC BLOOD PRESSURE: 133 MMHG | WEIGHT: 205 LBS | HEART RATE: 80 BPM | DIASTOLIC BLOOD PRESSURE: 77 MMHG | BODY MASS INDEX: 34.16 KG/M2

## 2022-08-01 DIAGNOSIS — Z11.4 SCREENING FOR HUMAN IMMUNODEFICIENCY VIRUS: ICD-10-CM

## 2022-08-01 DIAGNOSIS — Z12.11 SCREENING FOR COLON CANCER: ICD-10-CM

## 2022-08-01 DIAGNOSIS — R63.4 WEIGHT LOSS: ICD-10-CM

## 2022-08-01 DIAGNOSIS — R73.9 ELEVATED BLOOD SUGAR: ICD-10-CM

## 2022-08-01 DIAGNOSIS — I10 ESSENTIAL HYPERTENSION: Primary | Chronic | ICD-10-CM

## 2022-08-01 PROCEDURE — 3008F BODY MASS INDEX DOCD: CPT | Mod: CPTII,S$GLB,, | Performed by: INTERNAL MEDICINE

## 2022-08-01 PROCEDURE — 99214 PR OFFICE/OUTPT VISIT, EST, LEVL IV, 30-39 MIN: ICD-10-PCS | Mod: S$GLB,,, | Performed by: INTERNAL MEDICINE

## 2022-08-01 PROCEDURE — 99214 OFFICE O/P EST MOD 30 MIN: CPT | Mod: S$GLB,,, | Performed by: INTERNAL MEDICINE

## 2022-08-01 PROCEDURE — 1160F PR REVIEW ALL MEDS BY PRESCRIBER/CLIN PHARMACIST DOCUMENTED: ICD-10-PCS | Mod: CPTII,S$GLB,, | Performed by: INTERNAL MEDICINE

## 2022-08-01 PROCEDURE — 3075F SYST BP GE 130 - 139MM HG: CPT | Mod: CPTII,S$GLB,, | Performed by: INTERNAL MEDICINE

## 2022-08-01 PROCEDURE — 1159F MED LIST DOCD IN RCRD: CPT | Mod: CPTII,S$GLB,, | Performed by: INTERNAL MEDICINE

## 2022-08-01 PROCEDURE — 3078F DIAST BP <80 MM HG: CPT | Mod: CPTII,S$GLB,, | Performed by: INTERNAL MEDICINE

## 2022-08-01 PROCEDURE — 3075F PR MOST RECENT SYSTOLIC BLOOD PRESS GE 130-139MM HG: ICD-10-PCS | Mod: CPTII,S$GLB,, | Performed by: INTERNAL MEDICINE

## 2022-08-01 PROCEDURE — 3008F PR BODY MASS INDEX (BMI) DOCUMENTED: ICD-10-PCS | Mod: CPTII,S$GLB,, | Performed by: INTERNAL MEDICINE

## 2022-08-01 PROCEDURE — 1160F RVW MEDS BY RX/DR IN RCRD: CPT | Mod: CPTII,S$GLB,, | Performed by: INTERNAL MEDICINE

## 2022-08-01 PROCEDURE — 1159F PR MEDICATION LIST DOCUMENTED IN MEDICAL RECORD: ICD-10-PCS | Mod: CPTII,S$GLB,, | Performed by: INTERNAL MEDICINE

## 2022-08-01 PROCEDURE — 3078F PR MOST RECENT DIASTOLIC BLOOD PRESSURE < 80 MM HG: ICD-10-PCS | Mod: CPTII,S$GLB,, | Performed by: INTERNAL MEDICINE

## 2022-08-04 ENCOUNTER — TELEPHONE (OUTPATIENT)
Dept: FAMILY MEDICINE | Facility: CLINIC | Age: 47
End: 2022-08-04

## 2022-08-24 LAB
ALBUMIN SERPL-MCNC: 4.4 G/DL (ref 3.8–4.8)
ALBUMIN/CREAT UR: 4 MG/G CREAT (ref 0–29)
ALBUMIN/GLOB SERPL: 1.5 {RATIO} (ref 1.2–2.2)
ALP SERPL-CCNC: 43 IU/L (ref 44–121)
ALT SERPL-CCNC: 10 IU/L (ref 0–32)
AST SERPL-CCNC: 19 IU/L (ref 0–40)
BASOPHILS # BLD AUTO: 0 X10E3/UL (ref 0–0.2)
BASOPHILS NFR BLD AUTO: 1 %
BILIRUB SERPL-MCNC: 0.6 MG/DL (ref 0–1.2)
BUN SERPL-MCNC: 8 MG/DL (ref 6–24)
BUN/CREAT SERPL: 12 (ref 9–23)
CALCIUM SERPL-MCNC: 9.5 MG/DL (ref 8.7–10.2)
CHLORIDE SERPL-SCNC: 98 MMOL/L (ref 96–106)
CHOLEST SERPL-MCNC: 221 MG/DL (ref 100–199)
CO2 SERPL-SCNC: 27 MMOL/L (ref 20–29)
CREAT SERPL-MCNC: 0.69 MG/DL (ref 0.57–1)
CREAT UR-MCNC: 256.5 MG/DL
EOSINOPHIL # BLD AUTO: 0.1 X10E3/UL (ref 0–0.4)
EOSINOPHIL NFR BLD AUTO: 1 %
ERYTHROCYTE [DISTWIDTH] IN BLOOD BY AUTOMATED COUNT: 13.1 % (ref 11.7–15.4)
EST. GFR  (NO RACE VARIABLE): 107 ML/MIN/1.73
GLOBULIN SER CALC-MCNC: 3 G/DL (ref 1.5–4.5)
GLUCOSE SERPL-MCNC: 131 MG/DL (ref 65–99)
HBA1C MFR BLD: 7.3 % (ref 4.8–5.6)
HCT VFR BLD AUTO: 39.1 % (ref 34–46.6)
HDLC SERPL-MCNC: 55 MG/DL
HGB BLD-MCNC: 13.2 G/DL (ref 11.1–15.9)
HIV 1+2 AB+HIV1 P24 AG SERPL QL IA: NON REACTIVE
IMM GRANULOCYTES # BLD AUTO: 0 X10E3/UL (ref 0–0.1)
IMM GRANULOCYTES NFR BLD AUTO: 0 %
LDLC SERPL CALC-MCNC: 152 MG/DL (ref 0–99)
LYMPHOCYTES # BLD AUTO: 3.8 X10E3/UL (ref 0.7–3.1)
LYMPHOCYTES NFR BLD AUTO: 54 %
MCH RBC QN AUTO: 29.2 PG (ref 26.6–33)
MCHC RBC AUTO-ENTMCNC: 33.8 G/DL (ref 31.5–35.7)
MCV RBC AUTO: 87 FL (ref 79–97)
MICROALBUMIN UR-MCNC: 11.4 UG/ML
MONOCYTES # BLD AUTO: 0.5 X10E3/UL (ref 0.1–0.9)
MONOCYTES NFR BLD AUTO: 7 %
NEUTROPHILS # BLD AUTO: 2.6 X10E3/UL (ref 1.4–7)
NEUTROPHILS NFR BLD AUTO: 37 %
PLATELET # BLD AUTO: 336 X10E3/UL (ref 150–450)
POTASSIUM SERPL-SCNC: 3.7 MMOL/L (ref 3.5–5.2)
PROT SERPL-MCNC: 7.4 G/DL (ref 6–8.5)
RBC # BLD AUTO: 4.52 X10E6/UL (ref 3.77–5.28)
SODIUM SERPL-SCNC: 138 MMOL/L (ref 134–144)
TRIGL SERPL-MCNC: 79 MG/DL (ref 0–149)
TSH SERPL DL<=0.005 MIU/L-ACNC: 1.68 UIU/ML (ref 0.45–4.5)
VLDLC SERPL CALC-MCNC: 14 MG/DL (ref 5–40)
WBC # BLD AUTO: 7 X10E3/UL (ref 3.4–10.8)

## 2022-10-01 PROBLEM — K63.5 HYPERPLASTIC POLYP OF INTESTINE: Status: ACTIVE | Noted: 2022-08-26

## 2022-10-01 PROBLEM — Z98.890 H/O COLONOSCOPY: Status: ACTIVE | Noted: 2022-08-26

## 2022-10-18 ENCOUNTER — PATIENT MESSAGE (OUTPATIENT)
Dept: FAMILY MEDICINE | Facility: CLINIC | Age: 47
End: 2022-10-18

## 2022-12-08 ENCOUNTER — OFFICE VISIT (OUTPATIENT)
Dept: FAMILY MEDICINE | Facility: CLINIC | Age: 47
End: 2022-12-08
Payer: COMMERCIAL

## 2022-12-08 VITALS
HEART RATE: 78 BPM | HEIGHT: 65 IN | BODY MASS INDEX: 34.66 KG/M2 | SYSTOLIC BLOOD PRESSURE: 130 MMHG | DIASTOLIC BLOOD PRESSURE: 80 MMHG | WEIGHT: 208 LBS

## 2022-12-08 DIAGNOSIS — R73.9 ELEVATED BLOOD SUGAR: Primary | ICD-10-CM

## 2022-12-08 DIAGNOSIS — I10 ESSENTIAL HYPERTENSION: ICD-10-CM

## 2022-12-08 DIAGNOSIS — M79.674 PAIN OF RIGHT GREAT TOE: ICD-10-CM

## 2022-12-08 PROCEDURE — 99213 OFFICE O/P EST LOW 20 MIN: CPT | Mod: S$GLB,,, | Performed by: INTERNAL MEDICINE

## 2022-12-08 PROCEDURE — 3079F DIAST BP 80-89 MM HG: CPT | Mod: CPTII,S$GLB,, | Performed by: INTERNAL MEDICINE

## 2022-12-08 PROCEDURE — 3061F NEG MICROALBUMINURIA REV: CPT | Mod: CPTII,S$GLB,, | Performed by: INTERNAL MEDICINE

## 2022-12-08 PROCEDURE — 3008F BODY MASS INDEX DOCD: CPT | Mod: CPTII,S$GLB,, | Performed by: INTERNAL MEDICINE

## 2022-12-08 PROCEDURE — 3051F PR MOST RECENT HEMOGLOBIN A1C LEVEL 7.0 - < 8.0%: ICD-10-PCS | Mod: CPTII,S$GLB,, | Performed by: INTERNAL MEDICINE

## 2022-12-08 PROCEDURE — 3008F PR BODY MASS INDEX (BMI) DOCUMENTED: ICD-10-PCS | Mod: CPTII,S$GLB,, | Performed by: INTERNAL MEDICINE

## 2022-12-08 PROCEDURE — 99213 PR OFFICE/OUTPT VISIT, EST, LEVL III, 20-29 MIN: ICD-10-PCS | Mod: S$GLB,,, | Performed by: INTERNAL MEDICINE

## 2022-12-08 PROCEDURE — 3079F PR MOST RECENT DIASTOLIC BLOOD PRESSURE 80-89 MM HG: ICD-10-PCS | Mod: CPTII,S$GLB,, | Performed by: INTERNAL MEDICINE

## 2022-12-08 PROCEDURE — 3051F HG A1C>EQUAL 7.0%<8.0%: CPT | Mod: CPTII,S$GLB,, | Performed by: INTERNAL MEDICINE

## 2022-12-08 PROCEDURE — 1160F RVW MEDS BY RX/DR IN RCRD: CPT | Mod: CPTII,S$GLB,, | Performed by: INTERNAL MEDICINE

## 2022-12-08 PROCEDURE — 3066F PR DOCUMENTATION OF TREATMENT FOR NEPHROPATHY: ICD-10-PCS | Mod: CPTII,S$GLB,, | Performed by: INTERNAL MEDICINE

## 2022-12-08 PROCEDURE — 3075F PR MOST RECENT SYSTOLIC BLOOD PRESS GE 130-139MM HG: ICD-10-PCS | Mod: CPTII,S$GLB,, | Performed by: INTERNAL MEDICINE

## 2022-12-08 PROCEDURE — 1159F MED LIST DOCD IN RCRD: CPT | Mod: CPTII,S$GLB,, | Performed by: INTERNAL MEDICINE

## 2022-12-08 PROCEDURE — 1159F PR MEDICATION LIST DOCUMENTED IN MEDICAL RECORD: ICD-10-PCS | Mod: CPTII,S$GLB,, | Performed by: INTERNAL MEDICINE

## 2022-12-08 PROCEDURE — 3061F PR NEG MICROALBUMINURIA RESULT DOCUMENTED/REVIEW: ICD-10-PCS | Mod: CPTII,S$GLB,, | Performed by: INTERNAL MEDICINE

## 2022-12-08 PROCEDURE — 1160F PR REVIEW ALL MEDS BY PRESCRIBER/CLIN PHARMACIST DOCUMENTED: ICD-10-PCS | Mod: CPTII,S$GLB,, | Performed by: INTERNAL MEDICINE

## 2022-12-08 PROCEDURE — 3075F SYST BP GE 130 - 139MM HG: CPT | Mod: CPTII,S$GLB,, | Performed by: INTERNAL MEDICINE

## 2022-12-08 PROCEDURE — 3066F NEPHROPATHY DOC TX: CPT | Mod: CPTII,S$GLB,, | Performed by: INTERNAL MEDICINE

## 2022-12-08 RX ORDER — COLCHICINE 0.6 MG/1
0.6 TABLET ORAL 2 TIMES DAILY
Qty: 10 TABLET | Refills: 0 | Status: SHIPPED | OUTPATIENT
Start: 2022-12-08 | End: 2023-12-08

## 2022-12-08 NOTE — PROGRESS NOTES
Subjective:       Patient ID: George Denny is a 47 y.o. female.    Chief Complaint: Hypertension, Pain in the toe, and Hyperglycemia    Patient is a 47-year-old  female who comes for follow-up.  Underlying medical issues are noted as below:-    1.-hypertension currently on atenolol chlorthalidone  2.-previous visit or weight loss which was unintentional and she had lost approximately 9 lb of weight.    3.-underlying stress related to family issues and her 's medical issues.    As a part of due diligence for weight loss I had advised her to be screened for potential colon cancer and given also the fact that she is 47 years old.  (Age for colon cancer screening is now above 45).  The colonoscopy findings have been reviewed and they were unremarkable.      Basic labs were also done and they were unremarkable.      Today on follow-up patient states that:-    Preventive care issues of pneumonia vaccine also have been discussed.    Social determinants of health care also have been reviewed.  It is pertinent for lack of structure, formal and consistent exercise.  She is under stress because of issues of  and having to take time off for FMLA.    Hypertension  This is a chronic problem. The current episode started more than 1 year ago. The problem has been gradually improving since onset. The problem is controlled. Pertinent negatives include no chest pain, headaches, palpitations or shortness of breath. Risk factors for coronary artery disease include sedentary lifestyle, stress, obesity and post-menopausal state. Past treatments include beta blockers and diuretics. The current treatment provides moderate improvement. Compliance problems include psychosocial issues.  There is no history of kidney disease, CAD/MI or heart failure. There is no history of chronic renal disease, hypercortisolism, hyperparathyroidism, sleep apnea or a thyroid problem.     Past Medical History:   Diagnosis Date     GERD (gastroesophageal reflux disease)     H/O colonoscopy 2022    Hyperplastic polyp of intestine 2022    Hypertension     Thyroid disease     as per Dr Padilla     Social History     Socioeconomic History    Marital status:      Spouse name: Juan washburn    Number of children: 4   Occupational History    Occupation: merchandizer     Employer: [employer]     Comment: [employer]    Occupation: PSE/     Employer: [employer]   Tobacco Use    Smoking status: Never    Smokeless tobacco: Never   Substance and Sexual Activity    Alcohol use: Yes    Drug use: No    Sexual activity: Yes     Partners: Male   Social History Narrative    Children 27 24 19 14     Social Determinants of Health     Financial Resource Strain: Low Risk     Difficulty of Paying Living Expenses: Not very hard   Food Insecurity: No Food Insecurity    Worried About Running Out of Food in the Last Year: Never true    Ran Out of Food in the Last Year: Never true   Transportation Needs: No Transportation Needs    Lack of Transportation (Medical): No    Lack of Transportation (Non-Medical): No   Physical Activity: Inactive    Days of Exercise per Week: 0 days    Minutes of Exercise per Session: 0 min   Stress: Stress Concern Present    Feeling of Stress : Rather much   Social Connections: Moderately Integrated    Frequency of Communication with Friends and Family: More than three times a week    Frequency of Social Gatherings with Friends and Family: Twice a week    Attends [employer] Services: 1 to 4 times per year    Active Member of Clubs or Organizations: No    Attends Club or Organization Meetings: Never    Marital Status:    Housing Stability: Low Risk     Unable to Pay for Housing in the Last Year: No    Number of Places Lived in the Last Year: 1    Unstable Housing in the Last Year: No     Past Surgical History:   Procedure Laterality Date     SECTION      HERNIA REPAIR       HYSTERECTOMY       Family History   Problem Relation Age of Onset    Hypertension Mother     Heart disease Mother     Diabetes Father     Heart disease Father     Hypertension Father     Gout Father        Review of Systems   Constitutional:  Positive for unexpected weight change (Had lost 9 lb of weight which was attributed to stress.  Has gained 3 of it back.). Negative for activity change, appetite change, chills, fatigue and fever.   HENT:  Negative for drooling (History of Bell's palsy which has resolved now.) and postnasal drip.         Bell's palsy and gradually improving.  She wears glasses.   Eyes:  Negative for photophobia, discharge and itching.        She was seen also by optometrist Ms.Jessica Mcghee OD for the dry eyes as a complication of Bell's palsy.  She was recommended artificial tears and a gel at nighttime with eye shield.  She is utilizing those recommendations.   Respiratory:  Negative for cough, chest tightness and shortness of breath.    Cardiovascular:  Negative for chest pain, palpitations and leg swelling.        Underlying hypertension stable on Tenoretic 100/25 and aspirin.  Please note that she does  Not have a  diagnosis of coronary artery disease.  Aspirin is optional.   Gastrointestinal:  Negative for abdominal distention, abdominal pain and nausea.   Endocrine: Negative for heat intolerance, polydipsia and polyphagia.        Blood sugars are elevated and hemoglobin A1c will be checked again.   Genitourinary:  Negative for enuresis, hematuria and urgency.   Musculoskeletal:  Negative for back pain and myalgias.        Swelling and pain in the right great toe and also to some extent on the left great toe.  No history of gout thus far.   Skin:  Positive for rash. Negative for color change and wound.        Darkish pigmentation in the nape of neck and left side.?  Acanthosis nigricans   Neurological:  Positive for numbness. Negative for dizziness, tremors, syncope, facial asymmetry  "(Recovered from Bell's palsy.) and headaches.        Left-sided Bell's palsy with numbness and lack of facial movement.  She finds lack of taste also.  Taste is getting better now.  Drooling has stopped.  She does wear protective glasses for her eyes at this point.   Hematological:  Negative for adenopathy. Does not bruise/bleed easily.   Psychiatric/Behavioral:  Positive for behavioral problems. Negative for agitation and dysphoric mood.         Going through issues of stress and anxiety because 's condition and other family issues.  Had to take family time.  Father had passed away on March 2022.  She is slowly and steadily recuperating from that.       Objective:      Blood pressure 130/80, pulse 78, height 5' 5" (1.651 m), weight 94.3 kg (208 lb). Body mass index is 34.61 kg/m².  Physical Exam  Chaperone present: Not available Chaperone.   Constitutional:       General: She is not in acute distress.     Appearance: Normal appearance. She is obese. She is not ill-appearing, toxic-appearing or diaphoretic.      Comments: BMI is 34.61   Eyes:      General: No scleral icterus.        Left eye: No discharge.   Cardiovascular:      Rate and Rhythm: Normal rate and regular rhythm.      Pulses: Normal pulses.      Heart sounds: Normal heart sounds.   Pulmonary:      Effort: Pulmonary effort is normal.      Breath sounds: Normal breath sounds.   Abdominal:      Palpations: Abdomen is soft.      Tenderness: There is no abdominal tenderness.      Hernia: No hernia is present.   Musculoskeletal:      Cervical back: No rigidity or tenderness. No muscular tenderness.      Right lower leg: No edema.      Left lower leg: No edema.        Feet:    Skin:     General: Skin is warm.      Coloration: Skin is not jaundiced.      Findings: No bruising or erythema.   Neurological:      Mental Status: She is alert. Mental status is at baseline.      Motor: No weakness or tremor.      Comments: History of Bell's palsy and at this " point.   Psychiatric:         Mood and Affect: Mood normal.         Behavior: Behavior normal.         Assessment:       1. Elevated blood sugar    2. Pain of right great toe    3. Essential hypertension           No visits with results within 3 Month(s) from this visit.   Latest known visit with results is:   Office Visit on 08/01/2022   Component Date Value Ref Range Status    HIV Screen 4th Generation wRfx 08/23/2022 Non Reactive  Non Reactive Final    Glucose 08/23/2022 131 (H)  65 - 99 mg/dL Final    BUN 08/23/2022 8  6 - 24 mg/dL Final    Creatinine 08/23/2022 0.69  0.57 - 1.00 mg/dL Final    eGFR 08/23/2022 107  >59 mL/min/1.73 Final    BUN/Creatinine Ratio 08/23/2022 12  9 - 23 Final    Sodium 08/23/2022 138  134 - 144 mmol/L Final    Potassium 08/23/2022 3.7  3.5 - 5.2 mmol/L Final    Chloride 08/23/2022 98  96 - 106 mmol/L Final    CO2 08/23/2022 27  20 - 29 mmol/L Final    Calcium 08/23/2022 9.5  8.7 - 10.2 mg/dL Final    Protein, Total 08/23/2022 7.4  6.0 - 8.5 g/dL Final    Albumin 08/23/2022 4.4  3.8 - 4.8 g/dL Final    Globulin, Total 08/23/2022 3.0  1.5 - 4.5 g/dL Final    Albumin/Globulin Ratio 08/23/2022 1.5  1.2 - 2.2 Final    Total Bilirubin 08/23/2022 0.6  0.0 - 1.2 mg/dL Final    Alkaline Phosphatase 08/23/2022 43 (L)  44 - 121 IU/L Final    AST 08/23/2022 19  0 - 40 IU/L Final    ALT 08/23/2022 10  0 - 32 IU/L Final    Hemoglobin A1c 08/23/2022 7.3 (H)  4.8 - 5.6 % Final    Cholesterol 08/23/2022 221 (H)  100 - 199 mg/dL Final    Triglycerides 08/23/2022 79  0 - 149 mg/dL Final    HDL 08/23/2022 55  >39 mg/dL Final    VLDL Cholesterol Moiz 08/23/2022 14  5 - 40 mg/dL Final    LDL Calculated 08/23/2022 152 (H)  0 - 99 mg/dL Final    Creatinine, Urine 08/23/2022 256.5  Not Estab. mg/dL Final    Microalb, Ur 08/23/2022 11.4  Not Estab. ug/mL Final    Microalb/Crt. Ratio 08/23/2022 4  0 - 29 mg/g creat Final    WBC 08/23/2022 7.0  3.4 - 10.8 x10E3/uL Final    RBC 08/23/2022 4.52  3.77 - 5.28  x10E6/uL Final    Hemoglobin 08/23/2022 13.2  11.1 - 15.9 g/dL Final    Hematocrit 08/23/2022 39.1  34.0 - 46.6 % Final    MCV 08/23/2022 87  79 - 97 fL Final    MCH 08/23/2022 29.2  26.6 - 33.0 pg Final    MCHC 08/23/2022 33.8  31.5 - 35.7 g/dL Final    RDW 08/23/2022 13.1  11.7 - 15.4 % Final    Platelets 08/23/2022 336  150 - 450 x10E3/uL Final    Neutrophils 08/23/2022 37  Not Estab. % Final    Lymphs 08/23/2022 54  Not Estab. % Final    Monocytes 08/23/2022 7  Not Estab. % Final    Eos 08/23/2022 1  Not Estab. % Final    Basos 08/23/2022 1  Not Estab. % Final    Neutrophils (Absolute) 08/23/2022 2.6  1.4 - 7.0 x10E3/uL Final    Lymphs (Absolute) 08/23/2022 3.8 (H)  0.7 - 3.1 x10E3/uL Final    Monocytes(Absolute) 08/23/2022 0.5  0.1 - 0.9 x10E3/uL Final    Eos (Absolute) 08/23/2022 0.1  0.0 - 0.4 x10E3/uL Final    Baso (Absolute) 08/23/2022 0.0  0.0 - 0.2 x10E3/uL Final    Immature Granulocytes 08/23/2022 0  Not Estab. % Final    Immature Grans (Abs) 08/23/2022 0.0  0.0 - 0.1 x10E3/uL Final    TSH 08/23/2022 1.680  0.450 - 4.500 uIU/mL Final         Plan:           Elevated blood sugar  Comments:  Blood sugars and hemoglobin A1c are elevated.  I will check hemoglobin A1c again and if it is elevated will have to treat her for that.  She will continue to wa  Orders:  -     Hemoglobin A1C; Future; Expected date: 12/08/2022    Pain of right great toe  Comments:  This appears to be osteoarthritis.  Check uric acid to be sure that we are not dealing with gout.  Trial of colchicine 0.6 milligrams twice a day for 3-4 days  Orders:  -     Uric Acid; Future; Expected date: 12/08/2022  -     colchicine (COLCRYS) 0.6 mg tablet; Take 1 tablet (0.6 mg total) by mouth 2 (two) times daily. Take this medication with meals.  Dispense: 10 tablet; Refill: 0    Essential hypertension  Comments:  Blood pressures are doing okay    Check uric acid level.      Check hemoglobin A1c.  Please note that she might have got steroids for bell  palsy.  This could have raised her blood sugars.    Trial of colchicine 0.6 milligram twice a day.      Wear comfortable shoes.    Soak feet in warm water for 5 or 10 minutes when she comes back in evening.      Current Outpatient Medications:     ascorbic acid, vitamin C, (VITAMIN C) 100 MG tablet, Take 100 mg by mouth once daily., Disp: , Rfl:     aspirin (ASPIR-81 ORAL), Take 1 tablet by mouth once daily., Disp: , Rfl:     atenoloL-chlorthalidone (TENORETIC) 100-25 mg per tablet, TAKE 1/2 TABLET BY MOUTH EVERY DAY, Disp: 45 tablet, Rfl: 1    cranberry 400 mg Cap, Take by mouth., Disp: , Rfl:     cyanocobalamin, vitamin B-12, 1,000 mcg TbSR, Take by mouth., Disp: , Rfl:     colchicine (COLCRYS) 0.6 mg tablet, Take 1 tablet (0.6 mg total) by mouth 2 (two) times daily. Take this medication with meals., Disp: 10 tablet, Rfl: 0

## 2022-12-09 LAB
HBA1C MFR BLD: 7.1 % (ref 4.8–5.6)
URATE SERPL-MCNC: 5.5 MG/DL (ref 2.6–6.2)

## 2023-06-29 DIAGNOSIS — Z12.31 ENCOUNTER FOR SCREENING MAMMOGRAM FOR MALIGNANT NEOPLASM OF BREAST: Primary | ICD-10-CM

## 2023-07-07 ENCOUNTER — HOSPITAL ENCOUNTER (OUTPATIENT)
Dept: RADIOLOGY | Facility: HOSPITAL | Age: 48
Discharge: HOME | End: 2023-07-07
Attending: SPECIALIST
Payer: COMMERCIAL

## 2023-07-07 VITALS — HEIGHT: 65 IN | BODY MASS INDEX: 34.63 KG/M2 | WEIGHT: 207.88 LBS

## 2023-07-07 DIAGNOSIS — Z12.31 ENCOUNTER FOR SCREENING MAMMOGRAM FOR MALIGNANT NEOPLASM OF BREAST: ICD-10-CM

## 2023-07-07 PROCEDURE — 77067 SCR MAMMO BI INCL CAD: CPT | Mod: TC,PO

## 2023-09-27 DIAGNOSIS — I10 ESSENTIAL HYPERTENSION: ICD-10-CM

## 2023-09-28 RX ORDER — ATENOLOL AND CHLORTHALIDONE TABLET 100; 25 MG/1; MG/1
0.5 TABLET ORAL DAILY
Qty: 45 TABLET | Refills: 0 | Status: SHIPPED | OUTPATIENT
Start: 2023-09-28 | End: 2024-01-04

## 2024-01-04 DIAGNOSIS — I10 ESSENTIAL HYPERTENSION: ICD-10-CM

## 2024-01-04 RX ORDER — ATENOLOL AND CHLORTHALIDONE TABLET 100; 25 MG/1; MG/1
TABLET ORAL
Qty: 45 TABLET | Refills: 0 | Status: SHIPPED | OUTPATIENT
Start: 2024-01-04

## 2024-05-16 ENCOUNTER — PATIENT OUTREACH (OUTPATIENT)
Dept: ADMINISTRATIVE | Facility: HOSPITAL | Age: 49
End: 2024-05-16

## 2024-05-16 DIAGNOSIS — I10 ESSENTIAL HYPERTENSION: ICD-10-CM

## 2024-05-16 NOTE — PROGRESS NOTES
Population Health Chart Review & Patient Outreach Details      Additional [facility] Health Notes:               Updates Requested / Reviewed:      Updated Care Coordination Note, Care Everywhere, , External Sources: [facility] and [facility], and Immunizations Reconciliation Completed or Queried: [place]         Health Maintenance Topics Overdue:      [facility] Score: 6     Urine Screening  Eye Exam  Hemoglobin A1c  Lipid Panel  Foot Exam  Uncontrolled BP                       Health Maintenance Topic(s) Outreach Outcomes & Actions Taken:    Eye Exam - Outreach Outcomes & Actions Taken  : reminder pt portal message sent    Lab(s) - Outreach Outcomes & Actions Taken  : reminder pt portal message sent.    Diabetic Foot Exam - Outreach Outcomes & Actions Taken  : reminder pt portal message sent    Primary Care Appt - Outreach Outcomes & Actions Taken  : remider pt portal message sent

## 2024-05-20 ENCOUNTER — PATIENT MESSAGE (OUTPATIENT)
Dept: ADMINISTRATIVE | Facility: HOSPITAL | Age: 49
End: 2024-05-20

## 2024-05-20 RX ORDER — ATENOLOL AND CHLORTHALIDONE TABLET 100; 25 MG/1; MG/1
0.5 TABLET ORAL DAILY
Qty: 30 TABLET | Refills: 0 | Status: SHIPPED | OUTPATIENT
Start: 2024-05-20

## 2024-07-09 ENCOUNTER — PATIENT MESSAGE (OUTPATIENT)
Dept: ADMINISTRATIVE | Facility: HOSPITAL | Age: 49
End: 2024-07-09

## 2024-08-01 DIAGNOSIS — I10 ESSENTIAL HYPERTENSION: ICD-10-CM

## 2024-08-01 RX ORDER — ATENOLOL AND CHLORTHALIDONE TABLET 100; 25 MG/1; MG/1
0.5 TABLET ORAL DAILY
Qty: 10 TABLET | Refills: 0 | Status: SHIPPED | OUTPATIENT
Start: 2024-08-01

## 2024-08-03 ENCOUNTER — HOSPITAL ENCOUNTER (EMERGENCY)
Facility: HOSPITAL | Age: 49
Discharge: HOME | End: 2024-08-03
Attending: EMERGENCY MEDICINE
Payer: COMMERCIAL

## 2024-08-03 VITALS
HEIGHT: 65 IN | DIASTOLIC BLOOD PRESSURE: 85 MMHG | OXYGEN SATURATION: 100 % | BODY MASS INDEX: 34.32 KG/M2 | SYSTOLIC BLOOD PRESSURE: 144 MMHG | WEIGHT: 206 LBS | HEART RATE: 63 BPM | TEMPERATURE: 98 F | RESPIRATION RATE: 18 BRPM

## 2024-08-03 DIAGNOSIS — M54.50 ACUTE RIGHT-SIDED LOW BACK PAIN WITHOUT SCIATICA: Primary | ICD-10-CM

## 2024-08-03 LAB
B-HCG UR QL: NEGATIVE
BILIRUB UR QL STRIP: NEGATIVE
CLARITY UR: ABNORMAL
COLOR UR: YELLOW
CTP QC/QA: YES
GLUCOSE UR QL STRIP: NEGATIVE
HGB UR QL STRIP: NEGATIVE
KETONES UR QL STRIP: NEGATIVE
LEUKOCYTE ESTERASE UR QL STRIP: NEGATIVE
NITRITE UR QL STRIP: NEGATIVE
PH UR STRIP: 7 [PH] (ref 5–8)
PROT UR QL STRIP: NEGATIVE
SP GR UR STRIP: 1.02 (ref 1–1.03)
URN SPEC COLLECT METH UR: ABNORMAL
UROBILINOGEN UR STRIP-ACNC: ABNORMAL EU/DL

## 2024-08-03 PROCEDURE — 81025 URINE PREGNANCY TEST: CPT | Performed by: EMERGENCY MEDICINE

## 2024-08-03 PROCEDURE — 99285 EMERGENCY DEPT VISIT HI MDM: CPT | Mod: 25

## 2024-08-03 PROCEDURE — 63600175 PHARM REV CODE 636 W HCPCS: Performed by: EMERGENCY MEDICINE

## 2024-08-03 PROCEDURE — 96374 THER/PROPH/DIAG INJ IV PUSH: CPT

## 2024-08-03 PROCEDURE — 25000003 PHARM REV CODE 250: Performed by: EMERGENCY MEDICINE

## 2024-08-03 PROCEDURE — 81003 URINALYSIS AUTO W/O SCOPE: CPT | Performed by: EMERGENCY MEDICINE

## 2024-08-03 RX ORDER — KETOROLAC TROMETHAMINE 30 MG/ML
15 INJECTION, SOLUTION INTRAMUSCULAR; INTRAVENOUS
Status: COMPLETED | OUTPATIENT
Start: 2024-08-03 | End: 2024-08-03

## 2024-08-03 RX ORDER — METHOCARBAMOL 500 MG/1
1000 TABLET, FILM COATED ORAL 3 TIMES DAILY
Qty: 30 TABLET | Refills: 0 | Status: SHIPPED | OUTPATIENT
Start: 2024-08-03 | End: 2024-08-08

## 2024-08-03 RX ORDER — NAPROXEN 500 MG/1
500 TABLET ORAL 2 TIMES DAILY WITH MEALS
Qty: 15 TABLET | Refills: 0 | Status: SHIPPED | OUTPATIENT
Start: 2024-08-03

## 2024-08-03 RX ORDER — LIDOCAINE 50 MG/G
1 PATCH TOPICAL
Status: DISCONTINUED | OUTPATIENT
Start: 2024-08-03 | End: 2024-08-03 | Stop reason: HOSPADM

## 2024-08-03 RX ORDER — LIDOCAINE 50 MG/G
1 PATCH TOPICAL DAILY
Qty: 15 PATCH | Refills: 0 | Status: SHIPPED | OUTPATIENT
Start: 2024-08-03

## 2024-08-03 RX ORDER — HYDROCODONE BITARTRATE AND ACETAMINOPHEN 5; 325 MG/1; MG/1
1 TABLET ORAL EVERY 6 HOURS PRN
Qty: 12 TABLET | Refills: 0 | Status: SHIPPED | OUTPATIENT
Start: 2024-08-03

## 2024-08-03 RX ORDER — METHOCARBAMOL 500 MG/1
1000 TABLET, FILM COATED ORAL
Status: COMPLETED | OUTPATIENT
Start: 2024-08-03 | End: 2024-08-03

## 2024-08-03 RX ADMIN — LIDOCAINE 1 PATCH: 50 PATCH CUTANEOUS at 02:08

## 2024-08-03 RX ADMIN — KETOROLAC TROMETHAMINE 15 MG: 30 INJECTION, SOLUTION INTRAMUSCULAR at 01:08

## 2024-08-03 RX ADMIN — METHOCARBAMOL TABLETS 1000 MG: 500 TABLET, COATED ORAL at 02:08

## 2024-08-29 ENCOUNTER — TELEPHONE (OUTPATIENT)
Dept: FAMILY MEDICINE | Facility: CLINIC | Age: 49
End: 2024-08-29
Payer: COMMERCIAL

## 2024-08-29 NOTE — TELEPHONE ENCOUNTER
----- Message from Ruthie Morales sent at 8/29/2024  8:46 AM CDT -----  Type:  Appointment Request      Name of Caller:Portal Message   When is the first available appointment?n/a  Symptoms:annual   Best Call Back Number:[phone]  Additional Information: Sent: Wed August 28, 2024  1:44 PM    Message  Appointment Request From: George Denny  With Provider: Elias Blandon [[facility] Family / Internal Medicine]  Preferred Date Range: Any  Preferred Times: Any Time  Reason for visit: Annual checkup and refill on meds    Comments:  I need an appointment asap!

## 2024-08-29 NOTE — TELEPHONE ENCOUNTER
Called patient to advise she would need to be seen. Patient was advised to see first available provider and to schedule her follow up with . Verbal understanding was expressed and call transferred to scheduling desk.

## 2024-08-29 NOTE — TELEPHONE ENCOUNTER
----- Message from Tessa Montes sent at 8/28/2024  2:09 PM CDT -----  The patient said she has sent 2 Emails to get refills. Can you refill Atenolol. [facility] on [facility] [address]. If she needs an appointment please call and give her one. Pt's # [phone] GH

## 2024-08-30 ENCOUNTER — TELEPHONE (OUTPATIENT)
Dept: FAMILY MEDICINE | Facility: CLINIC | Age: 49
End: 2024-08-30
Payer: COMMERCIAL

## 2024-08-30 NOTE — TELEPHONE ENCOUNTER
Called pt after she logged off from virtual visit before being seen that the provider is ready now if she wants to log back onto the virtual to seen. The patient said she only had a 30 minute window for lunch so she will not be logging back on for the visit and will call back to reschedule. The patient was not seen.

## 2024-09-05 ENCOUNTER — TELEPHONE (OUTPATIENT)
Dept: FAMILY MEDICINE | Facility: CLINIC | Age: 49
End: 2024-09-05
Payer: COMMERCIAL

## 2024-09-05 ENCOUNTER — PATIENT MESSAGE (OUTPATIENT)
Dept: FAMILY MEDICINE | Facility: CLINIC | Age: 49
End: 2024-09-05
Payer: COMMERCIAL

## 2024-09-05 NOTE — TELEPHONE ENCOUNTER
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 I rescheduled her virtual for next week.     Can you send a few more blood pressure pills to last until Tuesday appointment or tell her go to urgent care for medications?      ----- Message from Tessa Montes sent at 9/3/2024  2:23 PM CDT -----  The patient had a virtual appointment Friday. She only had a 30 minute lunch and had to hang up. She wants to know can she do another virtual appointment tomorrow?  I offered her a virtual for tomorrow. She could not take it because of work. Please call pt's # [phone] GH

## 2024-09-06 DIAGNOSIS — I10 ESSENTIAL HYPERTENSION: ICD-10-CM

## 2024-09-07 ENCOUNTER — PATIENT MESSAGE (OUTPATIENT)
Dept: FAMILY MEDICINE | Facility: CLINIC | Age: 49
End: 2024-09-07
Payer: COMMERCIAL

## 2024-09-07 DIAGNOSIS — I10 ESSENTIAL HYPERTENSION: ICD-10-CM

## 2024-09-07 RX ORDER — ATENOLOL AND CHLORTHALIDONE TABLET 100; 25 MG/1; MG/1
0.5 TABLET ORAL DAILY
Qty: 90 TABLET | Refills: 0 | Status: SHIPPED | OUTPATIENT
Start: 2024-09-07

## 2024-09-07 RX ORDER — ATENOLOL AND CHLORTHALIDONE TABLET 100; 25 MG/1; MG/1
0.5 TABLET ORAL
Qty: 30 TABLET | OUTPATIENT
Start: 2024-09-07

## 2024-09-07 NOTE — PROGRESS NOTES
Disp Refills Start End ANEESH    atenoloL-chlorthalidone (TENORETIC) 100-25 mg per tablet 90 tablet 0 9/7/2024 -- No   Sig - Route: Take 0.5 tablets by mouth once daily. - Oral   Sent to pharmacy as: atenoloL-chlorthalidone (TENORETIC) 100-25 mg per tablet   Class: Normal   Notes to Pharmacy: Follow up expected soon   Order: [id number]   Date/Time Signed: 9/7/2024 17:53       E-Prescribing Status: Sent to pharmacy (9/7/2024  5:53 PM CDT)

## 2024-09-10 ENCOUNTER — OFFICE VISIT (OUTPATIENT)
Dept: FAMILY MEDICINE | Facility: CLINIC | Age: 49
End: 2024-09-10
Payer: COMMERCIAL

## 2024-09-10 ENCOUNTER — PATIENT MESSAGE (OUTPATIENT)
Dept: FAMILY MEDICINE | Facility: CLINIC | Age: 49
End: 2024-09-10

## 2024-09-10 VITALS
DIASTOLIC BLOOD PRESSURE: 75 MMHG | HEART RATE: 61 BPM | BODY MASS INDEX: 33.15 KG/M2 | WEIGHT: 199 LBS | SYSTOLIC BLOOD PRESSURE: 122 MMHG | HEIGHT: 65 IN

## 2024-09-10 DIAGNOSIS — Z12.31 ENCOUNTER FOR SCREENING MAMMOGRAM FOR BREAST CANCER: ICD-10-CM

## 2024-09-10 DIAGNOSIS — I10 ESSENTIAL HYPERTENSION: Primary | ICD-10-CM

## 2024-09-10 DIAGNOSIS — K80.20 CALCULUS OF GALLBLADDER WITHOUT CHOLECYSTITIS WITHOUT OBSTRUCTION: ICD-10-CM

## 2024-09-10 PROCEDURE — 1160F RVW MEDS BY RX/DR IN RCRD: CPT | Mod: CPTII,S$GLB,, | Performed by: INTERNAL MEDICINE

## 2024-09-10 PROCEDURE — 1159F MED LIST DOCD IN RCRD: CPT | Mod: CPTII,S$GLB,, | Performed by: INTERNAL MEDICINE

## 2024-09-10 PROCEDURE — 3074F SYST BP LT 130 MM HG: CPT | Mod: CPTII,S$GLB,, | Performed by: INTERNAL MEDICINE

## 2024-09-10 PROCEDURE — 3008F BODY MASS INDEX DOCD: CPT | Mod: CPTII,S$GLB,, | Performed by: INTERNAL MEDICINE

## 2024-09-10 PROCEDURE — 3078F DIAST BP <80 MM HG: CPT | Mod: CPTII,S$GLB,, | Performed by: INTERNAL MEDICINE

## 2024-09-10 PROCEDURE — 99999 PR PBB SHADOW E&M-EST. PATIENT-LVL III: CPT | Mod: PBBFAC,,, | Performed by: INTERNAL MEDICINE

## 2024-09-10 PROCEDURE — 99213 OFFICE O/P EST LOW 20 MIN: CPT | Mod: S$GLB,,, | Performed by: INTERNAL MEDICINE

## 2024-09-10 RX ORDER — ATENOLOL AND CHLORTHALIDONE TABLET 100; 25 MG/1; MG/1
0.5 TABLET ORAL DAILY
Qty: 90 TABLET | Refills: 3 | Status: CANCELLED | OUTPATIENT
Start: 2024-09-10

## 2024-09-10 NOTE — PROGRESS NOTES
Subjective:       Patient ID: George Denny is a 48 y.o. female.    Chief Complaint: Hypertension    Miss Vazquez is a pleasant 48-year-old female who comes for follow-up.  I am seeing her after more than 1-1/2 year now.  Probably due to some reasons she miss the appointment.    Her underlying medical issues are as following:-    1.-hypertension currently on atenolol chlorthalidone  2.-recently she had gone to the emergency room for right flank pain.  The CT scan was negative for kidney stones.  It did show a gallstone and some cysts in the kidney.    She was also trying to lose weight by watching her diet.  She has indeed lost good amount of weight since the last several years or so.    After resuming her blood pressure medications they are doing okay.      Social history:-    1.-she does not smoke but she does shesha [employer]ah  2.- she does not drink alcohol.      3.-she will be due for Tdap vaccination and mammogram.    Continue with COVID and flu precautions.  Consider shingles vaccine down the road.    Hypertension  This is a chronic problem. The current episode started more than 1 year ago. The problem has been waxing and waning since onset. The problem is controlled. Pertinent negatives include no chest pain, headaches, neck pain, palpitations or shortness of breath. Past treatments include beta blockers and diuretics. The current treatment provides moderate improvement. Compliance problems include psychosocial issues.  There is no history of pheochromocytoma or sleep apnea.       Past Medical History:   Diagnosis Date    GERD (gastroesophageal reflux disease)     H/O colonoscopy 8/26/2022    Hyperplastic polyp of intestine 8/26/2022    Hypertension     Thyroid disease     as per Dr Padilla     Social History     Socioeconomic History    Marital status:      Spouse name: Juan denny    Number of children: 4   Occupational History    Occupation: [employer]     Employer: [employer]      Comment: [employer]    Occupation: PSE/     Employer: [employer]   Tobacco Use    Smoking status: Never    Smokeless tobacco: Never   Substance and Sexual Activity    Alcohol use: Yes    Drug use: No    Sexual activity: Yes     Partners: Male   Social History Narrative    Children 27 24 19 14     Social Determinants of Health     Financial Resource Strain: Low Risk  (9/10/2024)    Overall Financial Resource Strain (CARDIA)     Difficulty of Paying Living Expenses: Not very hard   Recent Concern: Financial Resource Strain - High Risk (2024)    Overall Financial Resource Strain (CARDIA)     Difficulty of Paying Living Expenses: Hard   Food Insecurity: No Food Insecurity (9/10/2024)    Hunger Vital Sign     Worried About Running Out of Food in the Last Year: Never true     Ran Out of Food in the Last Year: Never true   Recent Concern: Food Insecurity - Food Insecurity Present (2024)    Hunger Vital Sign     Worried About Running Out of Food in the Last Year: Sometimes true     Ran Out of Food in the Last Year: Sometimes true   Transportation Needs: No Transportation Needs (2022)    PRAPARE - Transportation     Lack of Transportation (Medical): No     Lack of Transportation (Non-Medical): No   Physical Activity: Insufficiently Active (2024)    Exercise Vital Sign     Days of Exercise per Week: 1 day     Minutes of Exercise per Session: 10 min   Stress: Stress Concern Present (2024)    [employer] [place] [employer] - Occupational Stress Questionnaire     Feeling of Stress : To some extent   Housing Stability: Low Risk  (2022)    Housing Stability Vital Sign     Unable to Pay for Housing in the Last Year: No     Number of Places Lived in the Last Year: 1     Unstable Housing in the Last Year: No     Past Surgical History:   Procedure Laterality Date     SECTION      HERNIA REPAIR      HYSTERECTOMY       Family History   Problem Relation Name Age  "of Onset    Hypertension Mother      Heart disease Mother      Diabetes Father      Heart disease Father      Hypertension Father      Gout Father         Review of Systems   Constitutional:  Negative for activity change, chills, diaphoresis, fever and unexpected weight change (lost 8 lbs).   HENT:  Negative for hearing loss, rhinorrhea and trouble swallowing.    Eyes:  Negative for discharge and visual disturbance.   Respiratory:  Negative for chest tightness, shortness of breath and wheezing.    Cardiovascular:  Negative for chest pain and palpitations.        Hypertension   Gastrointestinal:  Negative for blood in stool, constipation, diarrhea and vomiting.   Endocrine: Negative for polydipsia and polyuria.   Genitourinary:  Negative for difficulty urinating, dysuria, hematuria and menstrual problem.   Musculoskeletal:  Negative for arthralgias, joint swelling and neck pain.   Neurological:  Negative for weakness and headaches.   Psychiatric/Behavioral:  Negative for agitation, confusion and dysphoric mood.          Objective:      Blood pressure 122/75, pulse 61, height 5' 5" (1.651 m), weight 90.3 kg (199 lb). Body mass index is 33.12 kg/m².  Physical Exam  Chaperone present: Not available Chaperone.   Constitutional:       General: She is not in acute distress.     Appearance: Normal appearance. She is obese. She is not ill-appearing, toxic-appearing or diaphoretic.      Comments: BMI is 34.61   Eyes:      General: No scleral icterus.        Left eye: No discharge.   Cardiovascular:      Rate and Rhythm: Normal rate and regular rhythm.      Pulses: Normal pulses.      Heart sounds: Normal heart sounds.   Pulmonary:      Effort: Pulmonary effort is normal.      Breath sounds: Normal breath sounds.   Abdominal:      Palpations: Abdomen is soft.      Tenderness: There is no abdominal tenderness.      Hernia: No hernia is present.   Musculoskeletal:      Cervical back: No rigidity or tenderness. No muscular " tenderness.      Right lower leg: No edema.      Left lower leg: No edema.   Skin:     General: Skin is warm.      Findings: No erythema.   Neurological:      Mental Status: She is alert. Mental status is at baseline.      Motor: No weakness or tremor.      Comments: History of Bell's palsy and at this point.   Psychiatric:         Mood and Affect: Mood normal.         Behavior: Behavior normal.           Assessment:       Admission on 08/03/2024, Discharged on 08/03/2024   Component Date Value Ref Range Status    Specimen UA 08/03/2024 Urine, Clean Catch   Final    Color, UA 08/03/2024 Yellow  Yellow, Straw, Olga Lidia Final    Appearance, UA 08/03/2024 Hazy (A)  Clear Final    pH, UA 08/03/2024 7.0  5.0 - 8.0 Final    Specific Gravity, UA 08/03/2024 1.025  1.005 - 1.030 Final    Protein, UA 08/03/2024 Negative  Negative Final    Glucose, UA 08/03/2024 Negative  Negative Final    Ketones, UA 08/03/2024 Negative  Negative Final    Bilirubin (UA) 08/03/2024 Negative  Negative Final    Occult Blood UA 08/03/2024 Negative  Negative Final    Nitrite, UA 08/03/2024 Negative  Negative Final    Urobilinogen, UA 08/03/2024 2.0-3.0 (A)  Negative EU/dL Final    Leukocytes, UA 08/03/2024 Negative  Negative Final    POC Preg Test, Ur 08/03/2024 Negative  Negative Final     Acceptable 08/03/2024 Yes   Final       1. Essential hypertension  Comments:  Blood pressures are doing good.  Orders:  -     Comprehensive Metabolic Panel; Future; Expected date: 09/11/2024  -     TSH; Future; Expected date: 09/11/2024  -     Microalbumin/Creatinine Ratio, Urine; Future; Expected date: 09/11/2024  -     Lipid Panel; Future; Expected date: 09/11/2024    2. Encounter for screening mammogram for breast cancer  -     Mammo Digital Screening Bilat w/ Tim; Future; Expected date: 10/10/2024    3. Calculus of gallbladder without cholecystitis without obstruction  Comments:  Incidental finding on CT scan noncontrast for abdomen.              Plan:     COMPARISON:  None     FINDINGS:  Lung bases are clear.  Bone window images show no acute or aggressive osseous abnormality.     Hepatic steatosis.  No focal hepatic lesion identified on this noncontrast exam.  Cholelithiasis.  No intrahepatic or extrahepatic bile duct dilation.  Spleen appears normal.  Small left upper quadrant splenules.  Pancreas is within normal limits.     No adrenal lesion.  2.4 cm right renal cyst.  No renal calculi or hydronephrosis.  Ureters are normal in caliber.  3.1 x 2.6 fluid attenuation lesion located along the superior aspect of the left urinary bladder.  No other urinary bladder abnormality.  Uterus is surgically absent.     Stomach is unremarkable.  No evidence of small-bowel obstruction.  Normal size appendix with no periappendiceal inflammatory changes.  No evidence of colitis.     No free fluid or free air within the abdomen or pelvis.  Small periumbilical ventral hernia contains fat.  No abdominal or pelvic lymphadenopathy.     Overall miss Vazquez is doing okay.  She was off her blood pressure medications and she had some swelling in the leg and blood pressures were elevated.  Upon resumption of medications she was doing okay.      Time to check labs and get on preventive care issues.    She should consider taking the flu shot also because she has to      Promised me that she will keep her regular follow-up.  She was not going to run away from this office    I will see her back in 6 months time.    Patient's accepts my challenge to lose 6 lb of weight in 6 months for follow-up.  She will be 193 lb or less.  If it does not make it up to that she was still need to keep a follow-up.    No follow-ups on file.      Current Outpatient Medications:     ascorbic acid, vitamin C, (VITAMIN C) 100 MG tablet, Take 100 mg by mouth once daily., Disp: , Rfl:     aspirin (ASPIR-81 ORAL), Take 1 tablet by mouth once daily., Disp: , Rfl:     atenoloL-chlorthalidone (TENORETIC)  100-25 mg per tablet, Take 0.5 tablets by mouth once daily., Disp: 90 tablet, Rfl: 0    Elias Blandon

## 2024-10-22 ENCOUNTER — HOSPITAL ENCOUNTER (OUTPATIENT)
Dept: RADIOLOGY | Facility: HOSPITAL | Age: 49
Discharge: HOME | End: 2024-10-22
Attending: INTERNAL MEDICINE
Payer: COMMERCIAL

## 2024-10-22 DIAGNOSIS — Z12.31 ENCOUNTER FOR SCREENING MAMMOGRAM FOR BREAST CANCER: ICD-10-CM

## 2024-10-22 PROCEDURE — 77067 SCR MAMMO BI INCL CAD: CPT | Mod: 26,,, | Performed by: RADIOLOGY

## 2024-10-22 PROCEDURE — 77063 BREAST TOMOSYNTHESIS BI: CPT | Mod: 26,,, | Performed by: RADIOLOGY

## 2024-10-22 PROCEDURE — 77063 BREAST TOMOSYNTHESIS BI: CPT | Mod: TC,PO

## 2025-04-03 DIAGNOSIS — I10 ESSENTIAL HYPERTENSION: ICD-10-CM

## 2025-04-03 RX ORDER — ATENOLOL AND CHLORTHALIDONE TABLET 100; 25 MG/1; MG/1
0.5 TABLET ORAL
Qty: 45 TABLET | Refills: 0 | Status: SHIPPED | OUTPATIENT
Start: 2025-04-03

## 2025-04-03 NOTE — TELEPHONE ENCOUNTER
Refill Routing Note   Medication(s) are not appropriate for processing by [facility] for the following reason(s):        Required labs outdated    [facility] action(s):  Defer   Requires labs : Yes               Appointments  past 12m or future 3m with PCP    Date Provider   Last Visit   9/10/2024 Elias Blandon MD   Next Visit   Visit date not found Elias Blandon MD   ED visits in past 90 days: 0        Note composed:6:05 PM 04/03/2025

## 2025-04-03 NOTE — TELEPHONE ENCOUNTER
Care Due:                  Date            Visit Type   Department     Provider  --------------------------------------------------------------------------------                                EP - [facility]      [address]  Last Visit: 09-      CARE ([facility])   FAMILY Harpreet Blandon  Next Visit: None Scheduled  None         None Found                                                            Last  Test          Frequency    Reason                     Performed    Due Date  --------------------------------------------------------------------------------    CMP.........  12 months..  atenoloL-chlorthalidone..  Not Found    Overdue    [facility] Embedded Care Due Messages. Reference number: [id number].   4/03/2025 5:12:14 PM CDT

## 2025-07-14 DIAGNOSIS — I10 ESSENTIAL HYPERTENSION: ICD-10-CM

## 2025-07-14 RX ORDER — ATENOLOL AND CHLORTHALIDONE TABLET 100; 25 MG/1; MG/1
0.5 TABLET ORAL DAILY
Qty: 15 TABLET | Refills: 0 | Status: SHIPPED | OUTPATIENT
Start: 2025-07-14

## 2025-07-14 NOTE — TELEPHONE ENCOUNTER
No care due was identified.  [facility] Embedded Care Due Messages. Reference number: [id number].   7/14/2025 2:41:20 PM CDT

## 2025-07-14 NOTE — TELEPHONE ENCOUNTER
Care Due:                  Date            Visit Type   Department     Provider  --------------------------------------------------------------------------------                                EP - [facility]      [address]  Last Visit: 09-      CARE ([facility])   FAMILY Harpreet Blandon  Next Visit: None Scheduled  None         None Found                                                            Last  Test          Frequency    Reason                     Performed    Due Date  --------------------------------------------------------------------------------    CMP.........  12 months..  atenoloL-chlorthalidone..  Not Found    Overdue    [employer] Embedded Care Due Messages. Reference number: [id number].   7/14/2025 2:21:57 PM CDT

## 2025-07-15 RX ORDER — ATENOLOL AND CHLORTHALIDONE TABLET 100; 25 MG/1; MG/1
0.5 TABLET ORAL
Qty: 45 TABLET | Refills: 0 | OUTPATIENT
Start: 2025-07-15

## 2025-07-15 NOTE — TELEPHONE ENCOUNTER
Refill Decision Note  Quick DC. Request already responded to by other means (e.g. phone or fax)    George Denny  is requesting a refill authorization.  Brief Assessment and Rationale for Refill:  Quick Discontinue     Medication Therapy Plan:       Medication Reconciliation Completed: No   Comments:      Provider Staff:  Action required for this patient    Requires labs      Please see care gap opportunities below in Care Due Message.    Thanks!  [facility]     Appointments      Date Provider   Last Visit   9/10/2024 Elias Blandon MD   Next Visit   7/14/2025 Elias Blandon MD          Note composed:1:47 PM 07/15/2025

## 2025-08-14 DIAGNOSIS — I10 ESSENTIAL HYPERTENSION: ICD-10-CM

## 2025-08-18 RX ORDER — ATENOLOL AND CHLORTHALIDONE TABLET 100; 25 MG/1; MG/1
0.5 TABLET ORAL DAILY
Qty: 15 TABLET | Refills: 0 | Status: SHIPPED | OUTPATIENT
Start: 2025-08-18

## 2025-09-02 ENCOUNTER — TELEPHONE (OUTPATIENT)
Dept: FAMILY MEDICINE | Facility: CLINIC | Age: 50
End: 2025-09-02
Payer: COMMERCIAL